# Patient Record
Sex: MALE | Race: WHITE | NOT HISPANIC OR LATINO
[De-identification: names, ages, dates, MRNs, and addresses within clinical notes are randomized per-mention and may not be internally consistent; named-entity substitution may affect disease eponyms.]

---

## 2021-08-10 ENCOUNTER — TRANSCRIPTION ENCOUNTER (OUTPATIENT)
Age: 66
End: 2021-08-10

## 2021-09-29 ENCOUNTER — APPOINTMENT (OUTPATIENT)
Dept: CARDIOLOGY | Facility: CLINIC | Age: 66
End: 2021-09-29
Payer: MEDICARE

## 2021-09-29 ENCOUNTER — NON-APPOINTMENT (OUTPATIENT)
Age: 66
End: 2021-09-29

## 2021-09-29 VITALS
HEART RATE: 79 BPM | SYSTOLIC BLOOD PRESSURE: 156 MMHG | HEIGHT: 74 IN | WEIGHT: 172 LBS | BODY MASS INDEX: 22.07 KG/M2 | DIASTOLIC BLOOD PRESSURE: 70 MMHG | TEMPERATURE: 98.1 F | RESPIRATION RATE: 16 BRPM | OXYGEN SATURATION: 99 %

## 2021-09-29 DIAGNOSIS — Z82.49 FAMILY HISTORY OF ISCHEMIC HEART DISEASE AND OTHER DISEASES OF THE CIRCULATORY SYSTEM: ICD-10-CM

## 2021-09-29 DIAGNOSIS — Z87.898 PERSONAL HISTORY OF OTHER SPECIFIED CONDITIONS: ICD-10-CM

## 2021-09-29 DIAGNOSIS — Z72.3 LACK OF PHYSICAL EXERCISE: ICD-10-CM

## 2021-09-29 DIAGNOSIS — Z78.9 OTHER SPECIFIED HEALTH STATUS: ICD-10-CM

## 2021-09-29 PROCEDURE — 93000 ELECTROCARDIOGRAM COMPLETE: CPT

## 2021-09-29 PROCEDURE — 99205 OFFICE O/P NEW HI 60 MIN: CPT

## 2021-09-29 NOTE — ASSESSMENT
[FreeTextEntry1] : Abnormal EKG -multiple CAD risk factor including his age, male sex, hypertension, long history of smoking ; I recommend echocardiography for LV size, wall motion, LVEF; I also recommended Lexiscan marker perfusion scan to see inducible ischemia.  Baby aspirin daily.\par \par Hypertension -uncontrolled; continue medications; low-salt diet; I added labetalol 20 mg twice daily; BP monitoring.\par \par Suspected PAD -he has cold lower extremities, I was not able to feel the pulses, he has no hair on his toes; JAMIE has been recommended; walking program has been recommended\par \par I advise lipid panel, BMP, hemoglobin A1c.\par \par Nicotine dependency -smoking she has been discussed with him; he understands clearly smoking associated hazards of variety of cancers, pulmonary/vascular complications; he is not interested in stopping smoking at this point.\par \par Aggressive risk factor modification has been discussed with a great length.  He will be reeval by me after cardiac testing.

## 2021-09-29 NOTE — PHYSICAL EXAM
[Well Developed] : well developed [Well Nourished] : well nourished [No Acute Distress] : no acute distress [Normal Conjunctiva] : normal conjunctiva [Normal Venous Pressure] : normal venous pressure [No Carotid Bruit] : no carotid bruit [Normal S1, S2] : normal S1, S2 [No Murmur] : no murmur [No Rub] : no rub [No Gallop] : no gallop [Clear Lung Fields] : clear lung fields [Good Air Entry] : good air entry [No Respiratory Distress] : no respiratory distress  [Soft] : abdomen soft [Non Tender] : non-tender [No Masses/organomegaly] : no masses/organomegaly [Normal Bowel Sounds] : normal bowel sounds [Normal Gait] : normal gait [No Edema] : no edema [No Cyanosis] : no cyanosis [No Clubbing] : no clubbing [No Varicosities] : no varicosities [No Rash] : no rash [No Skin Lesions] : no skin lesions [Moves all extremities] : moves all extremities [No Focal Deficits] : no focal deficits [Normal Speech] : normal speech [Alert and Oriented] : alert and oriented [Normal memory] : normal memory [de-identified] : Dressing applied to left upper chest, at the site of cancer

## 2021-09-29 NOTE — HISTORY OF PRESENT ILLNESS
[FreeTextEntry1] : 66-year-old  gentleman referred for cardiac evaluation for abnormal EKG.\par \par He had developed skin cancer squamous cell, he did not pay attention until it grew very large, currently he is taking Libtayo -immunotherapy injections and the lesion has decreased in size significantly.\par \par EKG confirmed possible old interval MI; patient is poor historian although he does not recollect having episode of chest pain.  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  He denies any exertional chest pain.  He gets short of breath on more than usual exertion.  Denies any PND orthopnea diaphoresis dizziness palpitations or edema or claudication symptoms.\par \par His other history includes depression and hypertension.\par \par No pressure CHF, MI, syncope.

## 2021-10-28 ENCOUNTER — NON-APPOINTMENT (OUTPATIENT)
Age: 66
End: 2021-10-28

## 2021-11-09 ENCOUNTER — APPOINTMENT (OUTPATIENT)
Dept: CARDIOLOGY | Facility: CLINIC | Age: 66
End: 2021-11-09
Payer: MEDICARE

## 2021-11-09 PROCEDURE — A9502: CPT

## 2021-11-09 PROCEDURE — 78452 HT MUSCLE IMAGE SPECT MULT: CPT

## 2021-11-09 PROCEDURE — 93306 TTE W/DOPPLER COMPLETE: CPT

## 2021-11-09 PROCEDURE — 93015 CV STRESS TEST SUPVJ I&R: CPT

## 2021-11-10 ENCOUNTER — APPOINTMENT (OUTPATIENT)
Dept: CARDIOLOGY | Facility: CLINIC | Age: 66
End: 2021-11-10
Payer: MEDICARE

## 2021-11-10 PROCEDURE — 93923 UPR/LXTR ART STDY 3+ LVLS: CPT

## 2021-11-15 ENCOUNTER — NON-APPOINTMENT (OUTPATIENT)
Age: 66
End: 2021-11-15

## 2021-11-22 ENCOUNTER — APPOINTMENT (OUTPATIENT)
Dept: CARDIOLOGY | Facility: CLINIC | Age: 66
End: 2021-11-22
Payer: MEDICARE

## 2021-11-22 VITALS
SYSTOLIC BLOOD PRESSURE: 114 MMHG | DIASTOLIC BLOOD PRESSURE: 48 MMHG | HEIGHT: 74 IN | TEMPERATURE: 96.9 F | BODY MASS INDEX: 22.07 KG/M2 | OXYGEN SATURATION: 93 % | HEART RATE: 60 BPM | WEIGHT: 172 LBS

## 2021-11-22 DIAGNOSIS — Z00.00 ENCOUNTER FOR GENERAL ADULT MEDICAL EXAMINATION W/OUT ABNORMAL FINDINGS: ICD-10-CM

## 2021-11-22 PROCEDURE — 99214 OFFICE O/P EST MOD 30 MIN: CPT

## 2021-11-22 RX ORDER — AMLODIPINE BESYLATE 5 MG/1
5 TABLET ORAL
Qty: 30 | Refills: 0 | Status: DISCONTINUED | COMMUNITY
Start: 2021-10-26 | End: 2021-11-22

## 2021-11-22 RX ORDER — AMLODIPINE BESYLATE 10 MG/1
10 TABLET ORAL
Qty: 90 | Refills: 3 | Status: DISCONTINUED | COMMUNITY
End: 2021-11-22

## 2021-12-08 ENCOUNTER — APPOINTMENT (OUTPATIENT)
Dept: CARDIOLOGY | Facility: CLINIC | Age: 66
End: 2021-12-08
Payer: MEDICARE

## 2021-12-08 ENCOUNTER — TRANSCRIPTION ENCOUNTER (OUTPATIENT)
Age: 66
End: 2021-12-08

## 2021-12-08 VITALS
BODY MASS INDEX: 23.1 KG/M2 | OXYGEN SATURATION: 95 % | WEIGHT: 180 LBS | HEART RATE: 59 BPM | SYSTOLIC BLOOD PRESSURE: 120 MMHG | DIASTOLIC BLOOD PRESSURE: 58 MMHG | HEIGHT: 74 IN

## 2021-12-08 PROCEDURE — 99214 OFFICE O/P EST MOD 30 MIN: CPT

## 2021-12-20 ENCOUNTER — APPOINTMENT (OUTPATIENT)
Dept: CARDIOLOGY | Facility: CLINIC | Age: 66
End: 2021-12-20

## 2021-12-20 ENCOUNTER — NON-APPOINTMENT (OUTPATIENT)
Age: 66
End: 2021-12-20

## 2021-12-27 ENCOUNTER — NON-APPOINTMENT (OUTPATIENT)
Age: 66
End: 2021-12-27

## 2021-12-29 ENCOUNTER — APPOINTMENT (OUTPATIENT)
Dept: CARDIOLOGY | Facility: CLINIC | Age: 66
End: 2021-12-29
Payer: MEDICARE

## 2021-12-29 VITALS
HEIGHT: 74 IN | WEIGHT: 173 LBS | SYSTOLIC BLOOD PRESSURE: 114 MMHG | OXYGEN SATURATION: 95 % | TEMPERATURE: 97.8 F | BODY MASS INDEX: 22.2 KG/M2 | DIASTOLIC BLOOD PRESSURE: 50 MMHG | HEART RATE: 56 BPM

## 2021-12-29 PROCEDURE — 99215 OFFICE O/P EST HI 40 MIN: CPT

## 2021-12-29 RX ORDER — OMEPRAZOLE 20 MG/1
20 CAPSULE, DELAYED RELEASE ORAL
Qty: 30 | Refills: 0 | Status: DISCONTINUED | COMMUNITY
Start: 2021-11-15 | End: 2021-12-29

## 2021-12-29 RX ORDER — NEOMYCIN AND POLYMYXIN B SULFATES AND HYDROCORTISONE OTIC 10; 3.5; 1 MG/ML; MG/ML; [USP'U]/ML
3.5-10000-1 SUSPENSION AURICULAR (OTIC)
Qty: 10 | Refills: 0 | Status: DISCONTINUED | COMMUNITY
Start: 2021-09-21 | End: 2021-12-29

## 2021-12-29 RX ORDER — PREDNISONE 20 MG/1
20 TABLET ORAL
Qty: 42 | Refills: 0 | Status: DISCONTINUED | COMMUNITY
Start: 2021-11-15 | End: 2021-12-29

## 2022-01-04 ENCOUNTER — OUTPATIENT (OUTPATIENT)
Dept: INPATIENT UNIT | Facility: HOSPITAL | Age: 67
LOS: 1 days | End: 2022-01-04
Payer: MEDICARE

## 2022-01-04 ENCOUNTER — NON-APPOINTMENT (OUTPATIENT)
Age: 67
End: 2022-01-04

## 2022-01-04 ENCOUNTER — RESULT REVIEW (OUTPATIENT)
Age: 67
End: 2022-01-04

## 2022-01-04 PROCEDURE — 93571 IV DOP VEL&/PRESS C FLO 1ST: CPT | Mod: 26,52,LD

## 2022-01-04 PROCEDURE — 93010 ELECTROCARDIOGRAM REPORT: CPT

## 2022-01-04 PROCEDURE — 93010 ELECTROCARDIOGRAM REPORT: CPT | Mod: 77,59

## 2022-01-04 PROCEDURE — 92928 PRQ TCAT PLMT NTRAC ST 1 LES: CPT | Mod: RC

## 2022-01-04 PROCEDURE — 92978 ENDOLUMINL IVUS OCT C 1ST: CPT | Mod: 26,RC

## 2022-01-04 PROCEDURE — 71045 X-RAY EXAM CHEST 1 VIEW: CPT | Mod: 26

## 2022-01-04 PROCEDURE — 93458 L HRT ARTERY/VENTRICLE ANGIO: CPT | Mod: 26,XU

## 2022-01-05 PROCEDURE — 93010 ELECTROCARDIOGRAM REPORT: CPT

## 2022-01-10 ENCOUNTER — RX CHANGE (OUTPATIENT)
Age: 67
End: 2022-01-10

## 2022-01-10 ENCOUNTER — APPOINTMENT (OUTPATIENT)
Dept: CARDIOLOGY | Facility: CLINIC | Age: 67
End: 2022-01-10
Payer: MEDICARE

## 2022-01-10 ENCOUNTER — NON-APPOINTMENT (OUTPATIENT)
Age: 67
End: 2022-01-10

## 2022-01-10 VITALS
WEIGHT: 176 LBS | BODY MASS INDEX: 22.59 KG/M2 | DIASTOLIC BLOOD PRESSURE: 40 MMHG | OXYGEN SATURATION: 95 % | SYSTOLIC BLOOD PRESSURE: 110 MMHG | HEIGHT: 74 IN | HEART RATE: 67 BPM | TEMPERATURE: 98 F

## 2022-01-10 PROCEDURE — 93000 ELECTROCARDIOGRAM COMPLETE: CPT

## 2022-01-10 PROCEDURE — 99215 OFFICE O/P EST HI 40 MIN: CPT

## 2022-01-10 RX ORDER — CLOPIDOGREL BISULFATE 75 MG/1
75 TABLET, FILM COATED ORAL
Qty: 98 | Refills: 3 | Status: DISCONTINUED | COMMUNITY
Start: 2022-01-10 | End: 2022-01-10

## 2022-01-10 RX ORDER — AZITHROMYCIN 250 MG/1
250 TABLET, FILM COATED ORAL
Qty: 6 | Refills: 0 | Status: DISCONTINUED | COMMUNITY
Start: 2021-11-29 | End: 2022-01-10

## 2022-01-10 RX ORDER — ROSUVASTATIN CALCIUM 10 MG/1
10 TABLET, FILM COATED ORAL DAILY
Qty: 90 | Refills: 3 | Status: DISCONTINUED | COMMUNITY
Start: 2021-11-22 | End: 2022-01-10

## 2022-01-10 RX ORDER — AMOXICILLIN AND CLAVULANATE POTASSIUM 875; 125 MG/1; MG/1
875-125 TABLET, COATED ORAL
Qty: 14 | Refills: 0 | Status: DISCONTINUED | COMMUNITY
Start: 2021-12-29 | End: 2022-01-10

## 2022-01-10 RX ORDER — TICAGRELOR 90 MG/1
90 TABLET ORAL TWICE DAILY
Refills: 0 | Status: DISCONTINUED | COMMUNITY
Start: 2022-01-04 | End: 2022-01-10

## 2022-01-10 NOTE — REASON FOR VISIT
[Symptom and Test Evaluation] : symptom and test evaluation [Coronary Artery Disease] : coronary artery disease [Family Member] : family member

## 2022-01-10 NOTE — HISTORY OF PRESENT ILLNESS
[FreeTextEntry1] : \par 66-year-old male smoker, former alcohol abuse, squamous and basal cell skin cancer treated, hypertension, hyperlipidemia, coronary artery disease with PCI to proximal RCA 1/4/22 here for follow-up after PCI.\par \par Transaminitis noted but much improved. 1-1.5 x uln.\par \par Compliant with medications.  Encouraged tobacco cessation.  Co-pay for Brilinta elevated.\par \par Right radial artery access site well-healing.\par \par \par TESTING:\par 1/4/22:\par  Coronary angiogram: IVUS guided PCI of proximal RCA with a 4.0 x 23 mm partha point (postdilated 4.5 mm).\par Moderate mid LAD disease not significant by far.  Normal LVEDP.\par \par 12/21 calcium score 683.  Concern for proximal multivessel disease\par Labwork: Hemoglobin 11.1.  Normal creatinine.  Elevated LFTs.  LDL 97\par 11/21 echocardiogram: EF 50, no wall motion abnormality, mild valvular heart disease.  PASP 36\par Pharmacologic NST: EF 46%, fixed defects.

## 2022-01-10 NOTE — PHYSICAL EXAM
[Well Developed] : well developed [Well Nourished] : well nourished [No Acute Distress] : no acute distress [Normal Conjunctiva] : normal conjunctiva [Normal Venous Pressure] : normal venous pressure [Normal S1, S2] : normal S1, S2 [No Rub] : no rub [No Gallop] : no gallop [Clear Lung Fields] : clear lung fields [Good Air Entry] : good air entry [No Respiratory Distress] : no respiratory distress  [Soft] : abdomen soft [Non Tender] : non-tender [Normal Gait] : normal gait [No Edema] : no edema [No Cyanosis] : no cyanosis [No Clubbing] : no clubbing [No Varicosities] : no varicosities [Moves all extremities] : moves all extremities [No Focal Deficits] : no focal deficits [Normal Speech] : normal speech [Alert and Oriented] : alert and oriented

## 2022-01-10 NOTE — DISCUSSION/SUMMARY
[FreeTextEntry1] : \par 66-year-old male smoker, former alcohol abuse, squamous and basal cell skin cancer treated, hypertension, hyperlipidemia, coronary artery disease with PCI to proximal RCA 1/4/22 here for follow-up after PCI. Transaminitis noted but much improved. 1-1.5 x uln.\par \par \par # CAD s/p PCI prox RCA, tobacco abuse, hypertension, hyperlipidemia: \par -Dual antiplatelet therapy for at least 6 months, longer pending clinical course. brilinta affordable. \par -With transaminitis, recheck LFTs this week.  If increasing, discussed with primary cardiologist about statin therapy. much improved thus far.\par - Cardiac rehab ordered \par - Tobacco  service consulted.\par - AAA u/s\par - Carotid duplex\par \par # pulm appt on wednesday. \par \par \par Follow-up with me in 3 months with cardiac rehab in interim.  We will set up follow-up with Dr. Thomson in 6 months.

## 2022-01-13 ENCOUNTER — NON-APPOINTMENT (OUTPATIENT)
Age: 67
End: 2022-01-13

## 2022-01-14 ENCOUNTER — NON-APPOINTMENT (OUTPATIENT)
Age: 67
End: 2022-01-14

## 2022-01-19 ENCOUNTER — APPOINTMENT (OUTPATIENT)
Dept: CARDIOLOGY | Facility: CLINIC | Age: 67
End: 2022-01-19

## 2022-01-27 DIAGNOSIS — F32.9 MAJOR DEPRESSIVE DISORDER, SINGLE EPISODE, UNSPECIFIED: ICD-10-CM

## 2022-01-27 DIAGNOSIS — F17.210 NICOTINE DEPENDENCE, CIGARETTES, UNCOMPLICATED: ICD-10-CM

## 2022-01-27 DIAGNOSIS — I25.10 ATHEROSCLEROTIC HEART DISEASE OF NATIVE CORONARY ARTERY WITHOUT ANGINA PECTORIS: ICD-10-CM

## 2022-01-27 DIAGNOSIS — E78.5 HYPERLIPIDEMIA, UNSPECIFIED: ICD-10-CM

## 2022-01-27 DIAGNOSIS — R93.1 ABNORMAL FINDINGS ON DIAGNOSTIC IMAGING OF HEART AND CORONARY CIRCULATION: ICD-10-CM

## 2022-01-27 DIAGNOSIS — I10 ESSENTIAL (PRIMARY) HYPERTENSION: ICD-10-CM

## 2022-01-27 DIAGNOSIS — R94.31 ABNORMAL ELECTROCARDIOGRAM [ECG] [EKG]: ICD-10-CM

## 2022-01-27 DIAGNOSIS — Z85.828 PERSONAL HISTORY OF OTHER MALIGNANT NEOPLASM OF SKIN: ICD-10-CM

## 2022-02-02 ENCOUNTER — NON-APPOINTMENT (OUTPATIENT)
Age: 67
End: 2022-02-02

## 2022-02-02 ENCOUNTER — APPOINTMENT (OUTPATIENT)
Dept: CARDIOLOGY | Facility: CLINIC | Age: 67
End: 2022-02-02
Payer: MEDICARE

## 2022-02-02 VITALS
WEIGHT: 190 LBS | OXYGEN SATURATION: 97 % | DIASTOLIC BLOOD PRESSURE: 58 MMHG | SYSTOLIC BLOOD PRESSURE: 126 MMHG | TEMPERATURE: 97.1 F | RESPIRATION RATE: 16 BRPM | HEIGHT: 74 IN | BODY MASS INDEX: 24.38 KG/M2 | HEART RATE: 60 BPM

## 2022-02-02 DIAGNOSIS — I25.10 ATHEROSCLEROTIC HEART DISEASE OF NATIVE CORONARY ARTERY W/OUT ANGINA PECTORIS: ICD-10-CM

## 2022-02-02 DIAGNOSIS — R94.39 ABNORMAL RESULT OF OTHER CARDIOVASCULAR FUNCTION STUDY: ICD-10-CM

## 2022-02-02 DIAGNOSIS — Z86.79 PERSONAL HISTORY OF OTHER DISEASES OF THE CIRCULATORY SYSTEM: ICD-10-CM

## 2022-02-02 PROCEDURE — 99215 OFFICE O/P EST HI 40 MIN: CPT

## 2022-02-02 RX ORDER — CEMIPLIMAB-RWLC 50 MG/ML
350 INJECTION INTRAVENOUS
Refills: 0 | Status: DISCONTINUED | COMMUNITY
End: 2022-02-02

## 2022-02-07 ENCOUNTER — RX CHANGE (OUTPATIENT)
Age: 67
End: 2022-02-07

## 2022-02-07 ENCOUNTER — NON-APPOINTMENT (OUTPATIENT)
Age: 67
End: 2022-02-07

## 2022-02-07 RX ORDER — TICAGRELOR 90 MG/1
90 TABLET ORAL TWICE DAILY
Qty: 180 | Refills: 3 | Status: DISCONTINUED | COMMUNITY
Start: 2022-01-10 | End: 2022-02-07

## 2022-02-07 RX ORDER — CLOPIDOGREL BISULFATE 300 MG/1
300 TABLET, FILM COATED ORAL
Qty: 2 | Refills: 0 | Status: DISCONTINUED | COMMUNITY
Start: 2022-02-07 | End: 2022-02-07

## 2022-02-08 ENCOUNTER — RX CHANGE (OUTPATIENT)
Age: 67
End: 2022-02-08

## 2022-03-17 ENCOUNTER — NON-APPOINTMENT (OUTPATIENT)
Age: 67
End: 2022-03-17

## 2022-04-11 ENCOUNTER — APPOINTMENT (OUTPATIENT)
Dept: CARDIOLOGY | Facility: CLINIC | Age: 67
End: 2022-04-11
Payer: MEDICARE

## 2022-04-11 VITALS
TEMPERATURE: 97.3 F | BODY MASS INDEX: 22.84 KG/M2 | SYSTOLIC BLOOD PRESSURE: 116 MMHG | OXYGEN SATURATION: 95 % | HEART RATE: 64 BPM | HEIGHT: 74 IN | DIASTOLIC BLOOD PRESSURE: 60 MMHG | WEIGHT: 178 LBS

## 2022-04-11 DIAGNOSIS — R06.03 ACUTE RESPIRATORY DISTRESS: ICD-10-CM

## 2022-04-11 PROCEDURE — 93979 VASCULAR STUDY: CPT

## 2022-04-11 PROCEDURE — 99215 OFFICE O/P EST HI 40 MIN: CPT

## 2022-04-11 PROCEDURE — 93880 EXTRACRANIAL BILAT STUDY: CPT

## 2022-04-11 RX ORDER — LABETALOL HYDROCHLORIDE 200 MG/1
200 TABLET, FILM COATED ORAL
Qty: 180 | Refills: 3 | Status: DISCONTINUED | COMMUNITY
Start: 2021-09-29 | End: 2022-04-11

## 2022-04-11 RX ORDER — ALBUTEROL SULFATE 108 UG/1
108 (90 BASE) AEROSOL, METERED RESPIRATORY (INHALATION) EVERY 6 HOURS
Refills: 0 | Status: DISCONTINUED | COMMUNITY
End: 2022-04-11

## 2022-04-11 RX ORDER — CLOPIDOGREL BISULFATE 75 MG/1
75 TABLET, FILM COATED ORAL ONCE
Qty: 8 | Refills: 0 | Status: COMPLETED | COMMUNITY
Start: 2022-02-07 | End: 2022-04-11

## 2022-04-11 RX ORDER — TIOTROPIUM BROMIDE INHALATION SPRAY 3.12 UG/1
2.5 SPRAY, METERED RESPIRATORY (INHALATION) DAILY
Refills: 0 | Status: ACTIVE | COMMUNITY

## 2022-04-11 RX ORDER — ASPIRIN 325 MG
81 TABLET ORAL
Qty: 90 | Refills: 3 | Status: COMPLETED | COMMUNITY
Start: 2021-12-20 | End: 2022-04-11

## 2022-04-24 ENCOUNTER — NON-APPOINTMENT (OUTPATIENT)
Age: 67
End: 2022-04-24

## 2022-04-26 ENCOUNTER — NON-APPOINTMENT (OUTPATIENT)
Age: 67
End: 2022-04-26

## 2022-04-26 NOTE — HISTORY OF PRESENT ILLNESS
[FreeTextEntry1] : \par 67-year-old male smoker, systolic heart failure, former alcohol abuse, squamous and basal cell skin cancer treated, hypertension, hyperlipidemia, coronary artery disease with PCI to proximal RCA 1/4/22. Current tobacco use.\par \par 4/2022: no angina. In interim, he had a pulmonary exacerbation after immunotherapy which improved.  Sees pulmonary. Had preoperative evaluation for flexible sigmoidoscopy for mass removal/colonoscopy given immunotherapy induced colitis. He was switched to Plavix  He denies angina or acute cardiac symptoms.  He has stable dyspnea with exertion and constant coughing. LDL 72.  Carotid duplex and AAA ultrasound done today as detailed. Compliant with medications. depression. fatigue. \par \par 1/2022 visit: Compliant with medications.  Encouraged tobacco cessation.  Co-pay for Brilinta elevated. Right radial artery access site well-healing.\par \par \par TESTING:\par 4/2022:\par CAROTID: No significant disease\par AAA SCREENING: No AAA\par LABWORK: Resolved liver profile.  LDL 72.  Triglycerides 115.  HDL 79.\par \par 1/4/22:\par Echocardiogram: EF 40 to 45%.  Global.  Mild diastolic dysfunction.  Normal RV function.  PASP 46.  Aortic root 4.1.\par Coronary angiogram: IVUS guided PCI of proximal RCA with a 4.0 x 23 mm partha point (postdilated 4.5 mm).\par Moderate mid LAD disease not significant by far.  Normal LVEDP.\par A1c 6.3.\par  \par 12/21 calcium score 683.  Concern for proximal multivessel disease\par Labwork: Hemoglobin 11.1.  Normal creatinine.  Elevated LFTs.  LDL 97\par 11/21 echocardiogram: EF 50, no wall motion abnormality, mild valvular heart disease.  PASP 36\par Pharmacologic NST: EF 46%, fixed defects.\par JAMIE: No significant disease

## 2022-04-26 NOTE — DISCUSSION/SUMMARY
[FreeTextEntry1] : \par 67-year-old male smoker, former alcohol abuse, squamous and basal cell skin cancer treated, hypertension, hyperlipidemia, coronary artery disease with PCI to proximal RCA 1/4/22.  Current tobacco use. No AAA and no significant carotid disease today.\par \par # PRE OPERATIVE CV EVALUATION FOR GI PROCEDURE INCLUDING BIOPSY/REMOVAL: METS>4. Had stent 3+ months ago. No recent ACS, decompensated heart failure, preclusive arrhythmias. \par - No further cardiac testing required prior to procedure. Continue cardiovascular medications as tolerated roma-procedurally except as detailed re antiplatelets. Judicious with sedation and fluids. \par - During time he needs to be off Plavix for the procedure, he will need to be on aspirin 81 mg daily monotherapy (after a 324 mg load). Then when he is deemed safe for Plavix then he will switch back to Plavix 75 mg daily monotherapy (after 300 mg load). \par - Consider pulmonary evaluation as well. \par \par # systolic chf 40-45% compensated, CAD s/p PCI prox RCA 1/4/22, tobacco abuse, hypertension, hyperlipidemia: \par - Can continue with Plavix and drop aspirin. Plavix monotherapy as he has completed 3 months without issue.  \par - Continue rosuvastatin 20 and serial lab work.  Goal LDL less than 70 currently 72.\par - Cardiac rehab ordered\par - Tobacco  service consulted.\par - labwork ordered\par - GDMT: switch labetalol to toprol 200. bp log in cardiac rehab and consider entresto in future. \par \par \par Follow closely with pulmonary and primary cardiologist.  Return to me in 6 months. TTE in 6 months or so.  ER precautions given to patient.

## 2022-04-28 ENCOUNTER — NON-APPOINTMENT (OUTPATIENT)
Age: 67
End: 2022-04-28

## 2022-06-26 ENCOUNTER — NON-APPOINTMENT (OUTPATIENT)
Age: 67
End: 2022-06-26

## 2022-06-27 ENCOUNTER — NON-APPOINTMENT (OUTPATIENT)
Age: 67
End: 2022-06-27

## 2022-07-13 ENCOUNTER — APPOINTMENT (OUTPATIENT)
Dept: CARDIOLOGY | Facility: CLINIC | Age: 67
End: 2022-07-13

## 2022-09-07 ENCOUNTER — NON-APPOINTMENT (OUTPATIENT)
Age: 67
End: 2022-09-07

## 2022-09-07 ENCOUNTER — OUTPATIENT (OUTPATIENT)
Dept: OUTPATIENT SERVICES | Facility: HOSPITAL | Age: 67
LOS: 1 days | End: 2022-09-07

## 2022-09-07 PROCEDURE — 88305 TISSUE EXAM BY PATHOLOGIST: CPT | Mod: 26

## 2022-09-08 DIAGNOSIS — D37.5 NEOPLASM OF UNCERTAIN BEHAVIOR OF RECTUM: ICD-10-CM

## 2022-09-10 ENCOUNTER — NON-APPOINTMENT (OUTPATIENT)
Age: 67
End: 2022-09-10

## 2022-09-12 ENCOUNTER — NON-APPOINTMENT (OUTPATIENT)
Age: 67
End: 2022-09-12

## 2022-09-13 ENCOUNTER — NON-APPOINTMENT (OUTPATIENT)
Age: 67
End: 2022-09-13

## 2022-09-15 ENCOUNTER — NON-APPOINTMENT (OUTPATIENT)
Age: 67
End: 2022-09-15

## 2022-09-27 ENCOUNTER — APPOINTMENT (OUTPATIENT)
Dept: COLORECTAL SURGERY | Facility: CLINIC | Age: 67
End: 2022-09-27

## 2022-10-18 ENCOUNTER — NON-APPOINTMENT (OUTPATIENT)
Age: 67
End: 2022-10-18

## 2022-10-19 ENCOUNTER — APPOINTMENT (OUTPATIENT)
Dept: CARDIOLOGY | Facility: CLINIC | Age: 67
End: 2022-10-19

## 2022-10-19 VITALS
HEIGHT: 74 IN | DIASTOLIC BLOOD PRESSURE: 74 MMHG | SYSTOLIC BLOOD PRESSURE: 134 MMHG | OXYGEN SATURATION: 98 % | BODY MASS INDEX: 26.95 KG/M2 | HEART RATE: 57 BPM | WEIGHT: 210 LBS | TEMPERATURE: 97.3 F

## 2022-10-19 PROCEDURE — 99214 OFFICE O/P EST MOD 30 MIN: CPT

## 2022-10-19 RX ORDER — SULFAMETHOXAZOLE AND TRIMETHOPRIM 800; 160 MG/1; MG/1
800-160 TABLET ORAL
Qty: 12 | Refills: 0 | Status: DISCONTINUED | COMMUNITY
Start: 2022-03-23 | End: 2022-10-19

## 2022-10-19 RX ORDER — PREDNISONE 10 MG/1
10 TABLET ORAL
Refills: 0 | Status: DISCONTINUED | COMMUNITY
End: 2022-10-19

## 2022-10-19 RX ORDER — METOPROLOL SUCCINATE 200 MG/1
200 TABLET, EXTENDED RELEASE ORAL
Qty: 90 | Refills: 0 | Status: DISCONTINUED | COMMUNITY
Start: 2022-10-13 | End: 2022-10-19

## 2022-10-19 RX ORDER — SERTRALINE HYDROCHLORIDE 50 MG/1
50 TABLET, FILM COATED ORAL DAILY
Qty: 60 | Refills: 0 | Status: DISCONTINUED | COMMUNITY
End: 2022-10-19

## 2022-10-19 RX ORDER — METOPROLOL SUCCINATE 100 MG/1
100 TABLET, EXTENDED RELEASE ORAL DAILY
Qty: 90 | Refills: 1 | Status: DISCONTINUED | COMMUNITY
Start: 2022-04-11 | End: 2022-10-19

## 2022-10-19 RX ORDER — PANTOPRAZOLE 40 MG/1
40 TABLET, DELAYED RELEASE ORAL
Qty: 30 | Refills: 0 | Status: DISCONTINUED | COMMUNITY
Start: 2022-03-23 | End: 2022-10-19

## 2022-10-19 NOTE — DISCUSSION/SUMMARY
[FreeTextEntry1] : \par # PRE OPERATIVE CV EVALUATION FOR GI PROCEDURES INCLUDING BIOPSY/REMOVAL: METS>4. Had stent 9+ months ago. No recent ACS, decompensated heart failure, preclusive arrhythmias. \par - No further cardiac testing required prior to procedure. Continue cardiovascular medications as tolerated roma-procedurally except as detailed re antiplatelets. Judicious with sedation and fluids. \par - During time he needs to be off Plavix for the procedure, he will need to be on aspirin 81 mg daily monotherapy (after a 324 mg load). Then when he is deemed safe for Plavix, he will switch back to Plavix 75 mg daily monotherapy. He can be 5 days off Plavix.\par - Consider pulmonary evaluation as well. \par \par # systolic chf 40-45% compensated, CAD s/p PCI prox RCA 1/4/22, tobacco abuse, hypertension, hyperlipidemia: \par - Plavix monotherapy \par - increase rosuvastatin to 40. Goal LDL less than 70.\par - Cardiac rehab ordered\par - Tobacco  service consulted.\par - labwork \par - GDMT: could only tolerate toprol 100. recheck TTE and decide with additional therapy.\par \par \par See me back after TTE and discuss GDMT uptitration if needed. Then will see back Dr. Thomson. Follow closely with pulmonary and primary cardiologist. ER precautions given to patient.\par

## 2022-10-19 NOTE — HISTORY OF PRESENT ILLNESS
[FreeTextEntry1] : \par 67-year-old male smoker, systolic heart failure, former alcohol abuse, squamous and basal cell skin cancer treated, hypertension, hyperlipidemia, coronary artery disease with PCI to proximal RCA 1/4/22. Current tobacco use.\par newly diagnosed cancer in polyp\par \par 10/2022: underwent colonoscopy removed several polyps and 1 cancerous per their report. pending further evaluation for margins etc. no angina. much improved. \par \par 4/2022: no angina. In interim, he had a pulmonary exacerbation after immunotherapy which improved.  Sees pulmonary. Had preoperative evaluation for flexible sigmoidoscopy for mass removal/colonoscopy given immunotherapy induced colitis. He was switched to Plavix  He denies angina or acute cardiac symptoms.  He has stable dyspnea with exertion and constant coughing. LDL 72.  Carotid duplex and AAA ultrasound done today as detailed. Compliant with medications. depression. fatigue. \par \par 1/2022 visit: Compliant with medications.  Encouraged tobacco cessation.  Co-pay for Brilinta elevated. Right radial artery access site well-healing.\par \par \par TESTING:\par 7/2022 LABWORK: hgb 13.1. normal cmp. LDL 86. normal TFT. a1c 6.3. \par \par 4/2022:\par CAROTID: No significant disease\par AAA SCREENING: No AAA\par LABWORK: Resolved liver profile.  LDL 72.  Triglycerides 115.  HDL 79.\par \par 1/4/22:\par Echocardiogram: EF 40 to 45%.  Global.  Mild diastolic dysfunction.  Normal RV function.  PASP 46.  Aortic root 4.1.\par Coronary angiogram: IVUS guided PCI of proximal RCA with a 4.0 x 23 mm partha point (postdilated 4.5 mm).\par Moderate mid LAD disease not significant by far.  Normal LVEDP.\par A1c 6.3.\par  \par 12/21 calcium score 683.  Concern for proximal multivessel disease\par Labwork: Hemoglobin 11.1.  Normal creatinine.  Elevated LFTs.  LDL 97\par 11/21 echocardiogram: EF 50, no wall motion abnormality, mild valvular heart disease.  PASP 36\par Pharmacologic NST: EF 46%, fixed defects.\par JAMIE: No significant disease\par

## 2022-11-09 ENCOUNTER — APPOINTMENT (OUTPATIENT)
Dept: CARDIOLOGY | Facility: CLINIC | Age: 67
End: 2022-11-09

## 2022-11-09 VITALS
WEIGHT: 215 LBS | BODY MASS INDEX: 27.59 KG/M2 | OXYGEN SATURATION: 96 % | HEART RATE: 53 BPM | DIASTOLIC BLOOD PRESSURE: 70 MMHG | TEMPERATURE: 97.1 F | HEIGHT: 74 IN | SYSTOLIC BLOOD PRESSURE: 126 MMHG

## 2022-11-09 PROCEDURE — 99215 OFFICE O/P EST HI 40 MIN: CPT

## 2022-11-09 PROCEDURE — 93306 TTE W/DOPPLER COMPLETE: CPT

## 2022-11-09 NOTE — HISTORY OF PRESENT ILLNESS
[FreeTextEntry1] : \par 67-year-old male smoker, systolic heart failure, former alcohol abuse, squamous and basal cell skin cancer treated, hypertension, hyperlipidemia, coronary artery disease with PCI to proximal RCA 1/4/22. Current tobacco use. newly diagnosed cancer in polyp\par \par 11/2022: colonoscopy biopsies show margins clean. dermatology with biopsies reportedly recurrence unfortunately. tte today low normal ef asc ao 4.5. declines cardiac rehab and tobacco  service. \par \par 10/2022: underwent colonoscopy removed several polyps and 1 cancerous per their report. pending further evaluation for margins etc. no angina. much improved. \par \par 4/2022: no angina. In interim, he had a pulmonary exacerbation after immunotherapy which improved.  Sees pulmonary. Had preoperative evaluation for flexible sigmoidoscopy for mass removal/colonoscopy given immunotherapy induced colitis. He was switched to Plavix  He denies angina or acute cardiac symptoms.  He has stable dyspnea with exertion and constant coughing. LDL 72.  Carotid duplex and AAA ultrasound done today as detailed. Compliant with medications. depression. fatigue. \par \par 1/2022 visit: Compliant with medications.  Encouraged tobacco cessation.  Co-pay for Brilinta elevated. Right radial artery access site well-healing.\par \par \par TESTING:\par 11/2022 TTE\par \par 7/2022 LABWORK: hgb 13.1. normal cmp. LDL 86. normal TFT. a1c 6.3. \par \par 4/2022:\par CAROTID: No significant disease\par AAA SCREENING: No AAA\par LABWORK: Resolved liver profile.  LDL 72.  Triglycerides 115.  HDL 79.\par \par 1/4/22:\par Echocardiogram: EF 40 to 45%.  Global.  Mild diastolic dysfunction.  Normal RV function.  PASP 46.  Aortic root 4.1.\par Coronary angiogram: IVUS guided PCI of proximal RCA with a 4.0 x 23 mm partha point (postdilated 4.5 mm).\par Moderate mid LAD disease not significant by far.  Normal LVEDP.\par A1c 6.3.\par  \par 12/21 calcium score 683.  Concern for proximal multivessel disease\par Labwork: Hemoglobin 11.1.  Normal creatinine.  Elevated LFTs.  LDL 97\par 11/21 echocardiogram: EF 50, no wall motion abnormality, mild valvular heart disease.  PASP 36\par Pharmacologic NST: EF 46%, fixed defects.\par JAMIE: No significant disease\par

## 2022-11-09 NOTE — DISCUSSION/SUMMARY
[FreeTextEntry1] : \par # PRE OPERATIVE CV EVALUATION FOR GI PROCEDURES INCLUDING BIOPSY/REMOVAL: METS>4. Had stent 9+ months ago. No recent ACS, decompensated heart failure, preclusive arrhythmias. \par - No further cardiac testing required prior to procedure. Continue cardiovascular medications as tolerated roma-procedurally except as detailed re antiplatelets. Judicious with sedation and fluids. \par - During time he needs to be off Plavix for the procedure, he will need to be on aspirin 81 mg daily monotherapy (after a 324 mg load). Then when he is deemed safe for Plavix, he will switch back to Plavix 75 mg daily monotherapy. He can be 5 days off Plavix.\par - Consider pulmonary evaluation as well. \par \par # systolic chf now recovered EF compensated, CAD s/p PCI prox RCA 1/4/22, tobacco abuse, hypertension, hyperlipidemia: \par - Plavix monotherapy \par - rosuvastatin 40. Goal LDL less than 70.\par - Cardiac rehab ordered again for [facility] \par - Tobacco  service consulted however he declines \par - serial labwork \par - GDMT: could only tolerate toprol 100. \par \par # Ascending aorta 4.5 cm: \par - TTE in 6 months ordered \par \par # Malignancies: per derm, onc, GI, colorectal. avoid libtayo. \par \par will see back Dr. Thomson 6 months with echo and labwork. Follow closely with pulmonary and primary cardiologist. ER precautions given to patient.\par

## 2022-12-02 ENCOUNTER — RX RENEWAL (OUTPATIENT)
Age: 67
End: 2022-12-02

## 2022-12-16 ENCOUNTER — NON-APPOINTMENT (OUTPATIENT)
Age: 67
End: 2022-12-16

## 2022-12-17 ENCOUNTER — NON-APPOINTMENT (OUTPATIENT)
Age: 67
End: 2022-12-17

## 2023-01-20 ENCOUNTER — NON-APPOINTMENT (OUTPATIENT)
Age: 68
End: 2023-01-20

## 2023-01-23 ENCOUNTER — NON-APPOINTMENT (OUTPATIENT)
Age: 68
End: 2023-01-23

## 2023-01-26 ENCOUNTER — NON-APPOINTMENT (OUTPATIENT)
Age: 68
End: 2023-01-26

## 2023-01-30 ENCOUNTER — APPOINTMENT (OUTPATIENT)
Dept: CARDIOLOGY | Facility: CLINIC | Age: 68
End: 2023-01-30
Payer: MEDICARE

## 2023-01-30 ENCOUNTER — NON-APPOINTMENT (OUTPATIENT)
Age: 68
End: 2023-01-30

## 2023-01-30 VITALS
HEIGHT: 74 IN | TEMPERATURE: 97.3 F | OXYGEN SATURATION: 96 % | DIASTOLIC BLOOD PRESSURE: 64 MMHG | SYSTOLIC BLOOD PRESSURE: 114 MMHG | HEART RATE: 61 BPM | BODY MASS INDEX: 27.59 KG/M2 | WEIGHT: 215 LBS

## 2023-01-30 PROCEDURE — 99214 OFFICE O/P EST MOD 30 MIN: CPT

## 2023-01-30 PROCEDURE — 93000 ELECTROCARDIOGRAM COMPLETE: CPT

## 2023-01-30 RX ORDER — NICOTINAMIDE 100; 2; 850; 750; 50; 27 UG/1; MG/1; UG/1; MG/1; UG/1; MG/1
TABLET ORAL
Refills: 0 | Status: DISCONTINUED | COMMUNITY
End: 2023-01-30

## 2023-02-03 ENCOUNTER — NON-APPOINTMENT (OUTPATIENT)
Age: 68
End: 2023-02-03

## 2023-02-09 NOTE — HISTORY OF PRESENT ILLNESS
[FreeTextEntry1] : \par 67-year-old male smoker, systolic heart failure, former alcohol abuse, squamous and basal cell skin cancer treated, hypertension, hyperlipidemia, coronary artery disease with PCI to proximal RCA 1/4/22. Current tobacco use. newly diagnosed cancer in polyp\par \par 1/2023 VISIT: no angina. feels well. no recent hospitalizations. doing cardiac rehab. lowering tobacco to 5 cig/day. \par \par 11/2022: colonoscopy biopsies show margins clean. dermatology with biopsies reportedly recurrence unfortunately. tte today low normal ef asc ao 4.5. declines cardiac rehab and tobacco  service. \par \par 10/2022: underwent colonoscopy removed several polyps and 1 cancerous per their report. pending further evaluation for margins etc. no angina. much improved. \par \par 4/2022: no angina. In interim, he had a pulmonary exacerbation after immunotherapy which improved.  Sees pulmonary. Had preoperative evaluation for flexible sigmoidoscopy for mass removal/colonoscopy given immunotherapy induced colitis. He was switched to Plavix  He denies angina or acute cardiac symptoms.  He has stable dyspnea with exertion and constant coughing. LDL 72.  Carotid duplex and AAA ultrasound done today as detailed. Compliant with medications. depression. fatigue. \par \par 1/2022 visit: Compliant with medications.  Encouraged tobacco cessation.  Co-pay for Brilinta elevated. Right radial artery access site well-healing.\par \par \par \par TESTING:\par 11/2022 TTE\par \par 7/2022 LABWORK: hgb 13.1. normal cmp. LDL 86. normal TFT. a1c 6.3. \par \par 4/2022:\par CAROTID: No significant disease\par AAA SCREENING: No AAA\par LABWORK: Resolved liver profile.  LDL 72.  Triglycerides 115.  HDL 79.\par \par 1/4/22:\par Echocardiogram: EF 40 to 45%.  Global.  Mild diastolic dysfunction.  Normal RV function.  PASP 46.  Aortic root 4.1.\par Coronary angiogram: IVUS guided PCI of proximal RCA with a 4.0 x 23 mm partha point (postdilated 4.5 mm).\par Moderate mid LAD disease not significant by far.  Normal LVEDP.\par A1c 6.3.\par  \par 12/21 calcium score 683.  Concern for proximal multivessel disease\par Labwork: Hemoglobin 11.1.  Normal creatinine.  Elevated LFTs.  LDL 97\par 11/21 echocardiogram: EF 50, no wall motion abnormality, mild valvular heart disease.  PASP 36\par Pharmacologic NST: EF 46%, fixed defects.\par JAMIE: No significant disease\par

## 2023-02-09 NOTE — DISCUSSION/SUMMARY
[FreeTextEntry1] : \par # PRE OPERATIVE CV EVALUATION FOR SHOULDER SURGERY: METS>4. Had stent 1+ year ago. No recent ACS, decompensated heart failure, preclusive arrhythmias. \par - No further cardiac testing required prior to procedure. Continue cardiovascular medications as tolerated roma-procedurally except as detailed re antiplatelets. Judicious with sedation and fluids. \par - During time he needs to be off Plavix for the procedure, he will need to be on aspirin 81 mg daily monotherapy (after a 324 mg load). Then when he is deemed safe for Plavix, he will switch back to Plavix 75 mg daily monotherapy. He can be 5 days off Plavix.\par - Consider pulmonary evaluation as well. \par \par # systolic chf now recovered EF compensated, CAD s/p PCI prox RCA 1/4/22, tobacco abuse, hypertension, hyperlipidemia: \par - Plavix monotherapy \par - rosuvastatin 40. Goal LDL less than 70.\par - Cardiac rehab [facility] \par - Tobacco  service consulted however he declines \par - serial labwork \par - GDMT: could only tolerate toprol 100. \par \par # Ascending aorta 4.5 cm: \par - TTE in 6 months ordered \par \par # Malignancies: per derm, onc, GI, colorectal. avoid libtayo. \par \par will see back Dr. Thomson 6 months with echo and labwork. Follow closely with pulmonary and primary cardiologist. ER precautions given to patient.\par

## 2023-03-02 ENCOUNTER — RX RENEWAL (OUTPATIENT)
Age: 68
End: 2023-03-02

## 2023-05-08 ENCOUNTER — APPOINTMENT (OUTPATIENT)
Dept: CARDIOLOGY | Facility: CLINIC | Age: 68
End: 2023-05-08
Payer: MEDICARE

## 2023-05-08 VITALS
SYSTOLIC BLOOD PRESSURE: 110 MMHG | OXYGEN SATURATION: 96 % | WEIGHT: 208 LBS | HEIGHT: 74 IN | BODY MASS INDEX: 26.69 KG/M2 | HEART RATE: 60 BPM | DIASTOLIC BLOOD PRESSURE: 62 MMHG

## 2023-05-08 PROCEDURE — 93308 TTE F-UP OR LMTD: CPT

## 2023-05-08 PROCEDURE — 99214 OFFICE O/P EST MOD 30 MIN: CPT

## 2023-05-08 RX ORDER — ALBUTEROL SULFATE 90 UG/1
AEROSOL, METERED RESPIRATORY (INHALATION)
Refills: 0 | Status: ACTIVE | COMMUNITY

## 2023-05-08 NOTE — HISTORY OF PRESENT ILLNESS
[FreeTextEntry1] : \par 68-year-old male smoker, systolic heart failure, former alcohol abuse, squamous and basal cell skin cancer treated, hypertension, hyperlipidemia, coronary artery disease with PCI to proximal RCA 1/4/22. tobacco use. newly diagnosed cancer in polyp\par \par 5/2023 VISIT: echo today stable asc ao 4.4 cm. procedure in interim left shoulder surgery went well. increased tobacco use to 1/2 ppd. seeing pulmonary reports doing great from it. \par \par 1/2023 VISIT: no angina. feels well. no recent hospitalizations. doing cardiac rehab. lowering tobacco to 5 cig/day. \par \par 11/2022: colonoscopy biopsies show margins clean. dermatology with biopsies reportedly recurrence unfortunately. tte today low normal ef asc ao 4.5. declines cardiac rehab and tobacco  service. \par \par 10/2022: underwent colonoscopy removed several polyps and 1 cancerous per their report. pending further evaluation for margins etc. no angina. much improved. \par \par 4/2022: no angina. In interim, he had a pulmonary exacerbation after immunotherapy which improved.  Sees pulmonary. Had preoperative evaluation for flexible sigmoidoscopy for mass removal/colonoscopy given immunotherapy induced colitis. He was switched to Plavix  He denies angina or acute cardiac symptoms.  He has stable dyspnea with exertion and constant coughing. LDL 72.  Carotid duplex and AAA ultrasound done today as detailed. Compliant with medications. depression. fatigue. \par \par 1/2022 visit: Compliant with medications.  Encouraged tobacco cessation.  Co-pay for Brilinta elevated. Right radial artery access site well-healing.\par \par \par \par TESTING:\par \par 5/2023 tte\par \par 11/2022 TTE\par \par 7/2022 LABWORK: hgb 13.1. normal cmp. LDL 86. normal TFT. a1c 6.3. \par \par 4/2022:\par CAROTID: No significant disease\par AAA SCREENING: No AAA\par LABWORK: Resolved liver profile.  LDL 72.  Triglycerides 115.  HDL 79.\par \par 1/4/22:\par Echocardiogram: EF 40 to 45%.  Global.  Mild diastolic dysfunction.  Normal RV function.  PASP 46.  Aortic root 4.1.\par Coronary angiogram: IVUS guided PCI of proximal RCA with a 4.0 x 23 mm partha point (postdilated 4.5 mm).\par Moderate mid LAD disease not significant by far.  Normal LVEDP.\par A1c 6.3.\par  \par 12/21 calcium score 683.  Concern for proximal multivessel disease\par Labwork: Hemoglobin 11.1.  Normal creatinine.  Elevated LFTs.  LDL 97\par 11/21 echocardiogram: EF 50, no wall motion abnormality, mild valvular heart disease.  PASP 36\par Pharmacologic NST: EF 46%, fixed defects.\par JAMIE: No significant disease\par

## 2023-05-08 NOTE — DISCUSSION/SUMMARY
[FreeTextEntry1] : \par 5/2023 VISIT: echo today stable asc ao 4.4 cm. procedure in interim left shoulder surgery went well. increased tobacco use to 1/2 ppd. seeing pulmonary reports doing great from it. \par \par \par # PRE OPERATIVE CV EVALUATION FOR colonoscopy: METS>4. Had stent 1+ year ago. No recent ACS, decompensated heart failure, preclusive arrhythmias. \par - No further cardiac testing required prior to procedure. Continue cardiovascular medications as tolerated roma-procedurally except as detailed re antiplatelets. Judicious with sedation and fluids. \par - During time he needs to be off Plavix for the procedure, he will need to be on aspirin 81 mg daily monotherapy (after a 324 mg load). Then when he is deemed safe for Plavix, he will switch back to Plavix 75 mg daily monotherapy. He can be 5 days off Plavix.\par - Consider pulmonary evaluation as well. \par \par # systolic chf now recovered EF compensated, CAD s/p PCI prox RCA 1/4/22, tobacco abuse, hypertension, hyperlipidemia: \par - Plavix monotherapy (if procedures, then asa 81 in interim after 324 load) \par - rosuvastatin 40. Goal LDL less than 70.\par - Cardiac rehab 18 sessions left [facility] \par - Tobacco  service consulted \par - serial labwork \par - GDMT: could only tolerate toprol 100. \par \par # Ascending aorta 4.5 cm: \par - TTE yearly. last 5/2023 \par - bp optimization \par \par # Malignancies: per derm, onc, GI, colorectal. avoid libtayo. \par \par Follow closely with pulmonary. ER precautions given to patient.\par 
Opt out

## 2023-05-09 ENCOUNTER — FORM ENCOUNTER (OUTPATIENT)
Age: 68
End: 2023-05-09

## 2023-05-16 ENCOUNTER — FORM ENCOUNTER (OUTPATIENT)
Age: 68
End: 2023-05-16

## 2023-10-07 ENCOUNTER — NON-APPOINTMENT (OUTPATIENT)
Age: 68
End: 2023-10-07

## 2023-10-08 RX ORDER — ROSUVASTATIN CALCIUM 40 MG/1
40 TABLET, FILM COATED ORAL
Qty: 90 | Refills: 3 | Status: ACTIVE | COMMUNITY
Start: 2023-10-08 | End: 1900-01-01

## 2024-01-08 ENCOUNTER — APPOINTMENT (OUTPATIENT)
Dept: CARDIOLOGY | Facility: CLINIC | Age: 69
End: 2024-01-08
Payer: MEDICARE

## 2024-01-08 ENCOUNTER — NON-APPOINTMENT (OUTPATIENT)
Age: 69
End: 2024-01-08

## 2024-01-08 PROCEDURE — 93000 ELECTROCARDIOGRAM COMPLETE: CPT

## 2024-01-08 PROCEDURE — 99215 OFFICE O/P EST HI 40 MIN: CPT

## 2024-01-08 RX ORDER — NICOTINAMIDE 100; 2; 850; 750; 50; 27 UG/1; MG/1; UG/1; MG/1; UG/1; MG/1
TABLET ORAL DAILY
Refills: 0 | Status: ACTIVE | COMMUNITY

## 2024-01-08 NOTE — DISCUSSION/SUMMARY
[FreeTextEntry1] : 68-year-old male smoker, systolic heart failure, former alcohol abuse, squamous and basal cell skin cancer treated, hypertension, hyperlipidemia, coronary artery disease with PCI to proximal RCA 1/4/22. tobacco use. newly diagnosed cancer in polyp  # Dyspnea over past months. EKG profoundly changed concerning for dynamic coronary disease. He is a very poor historian due to memory issues. still cigarette use. He has no new murmur and no wheezing. I am having a hard time discerning timeline so I would like him to go to [facility] for updated echocardiogram and coronary angiogram.  Also consider rule out PE as he had recent flight from [place].   # systolic chf now recovered EF compensated, CAD s/p PCI prox RCA 1/4/22, tobacco abuse - restart DAPT instead of plavix monotherapy until further delineation  - rosuvastatin 40. Goal LDL less than 70. - Tobacco  service consulted  - serial labwork  - GDMT: could only tolerate toprol 100.   # Ascending aorta 4.5 cm: TTE at [facility]   # Malignancies: per derm, onc, GI, colorectal. avoid libtayo. he states margins are clear.   Follow after [facility]. Follow closely with pulmonary. ER precautions given to patient.

## 2024-01-08 NOTE — HISTORY OF PRESENT ILLNESS
[FreeTextEntry1] : 68-year-old male smoker, systolic heart failure, former alcohol abuse, squamous and basal cell skin cancer treated, hypertension, hyperlipidemia, coronary artery disease with PCI to proximal RCA 1/4/22, tobacco use. newly diagnosed cancer in polyp  1/2024 VISIT: dyspnea over past months. EKG profoundly changed concerning for dynamic coronary disease. He is a very poor historian due to memory issues. still cigarette use. I am having a hard time discerning timeline.    5/2023 VISIT: echo today stable asc ao 4.4 cm. procedure in interim left shoulder surgery went well. increased tobacco use to 1/2 ppd. seeing pulmonary reports doing great from it.   1/2023 VISIT: no angina. feels well. no recent hospitalizations. doing cardiac rehab. lowering tobacco to 5 cig/day.   11/2022: colonoscopy biopsies show margins clean. dermatology with biopsies reportedly recurrence unfortunately. tte today low normal ef asc ao 4.5. declines cardiac rehab and tobacco  service.   10/2022: underwent colonoscopy removed several polyps and 1 cancerous per their report. pending further evaluation for margins etc. no angina. much improved.   4/2022: no angina. In interim, he had a pulmonary exacerbation after immunotherapy which improved.  Sees pulmonary. Had preoperative evaluation for flexible sigmoidoscopy for mass removal/colonoscopy given immunotherapy induced colitis. He was switched to Plavix  He denies angina or acute cardiac symptoms.  He has stable dyspnea with exertion and constant coughing. LDL 72.  Carotid duplex and AAA ultrasound done today as detailed. Compliant with medications. depression. fatigue.   1/2022 visit: Compliant with medications.  Encouraged tobacco cessation.  Co-pay for Brilinta elevated. Right radial artery access site well-healing.   TESTING I REVIEWED TODAY excluding above:  5/2023 tte  11/2022 TTE  7/2022 LABWORK: hgb 13.1. normal cmp. LDL 86. normal TFT. a1c 6.3.   4/2022: CAROTID: No significant disease AAA SCREENING: No AAA LABWORK: Resolved liver profile.  LDL 72.  Triglycerides 115.  HDL 79.  1/4/22: Echocardiogram: EF 40 to 45%.  Global.  Mild diastolic dysfunction.  Normal RV function.  PASP 46.  Aortic root 4.1. Coronary angiogram: IVUS guided PCI of proximal RCA with a 4.0 x 23 mm partha point (postdilated 4.5 mm). Moderate mid LAD disease not significant by far.  Normal LVEDP. A1c 6.3.   12/21 calcium score 683.  Concern for proximal multivessel disease Labwork: Hemoglobin 11.1.  Normal creatinine.  Elevated LFTs.  LDL 97 11/21 echocardiogram: EF 50, no wall motion abnormality, mild valvular heart disease.  PASP 36 Pharmacologic NST: EF 46%, fixed defects. JAMIE: No significant disease

## 2024-01-08 NOTE — REASON FOR VISIT
[Symptom and Test Evaluation] : symptom and test evaluation [Coronary Artery Disease] : coronary artery disease [Family Member] : family member [FreeTextEntry3] : Dr. Grace Gama

## 2024-01-08 NOTE — PHYSICAL EXAM
[Well Developed] : well developed [Well Nourished] : well nourished [No Acute Distress] : no acute distress [Normal Conjunctiva] : normal conjunctiva [Normal Venous Pressure] : normal venous pressure [Normal S1, S2] : normal S1, S2 [No Rub] : no rub [No Gallop] : no gallop [Clear Lung Fields] : clear lung fields [Good Air Entry] : good air entry [No Respiratory Distress] : no respiratory distress  [Non Tender] : non-tender [Soft] : abdomen soft [Normal Gait] : normal gait [No Edema] : no edema [No Cyanosis] : no cyanosis [No Clubbing] : no clubbing [No Varicosities] : no varicosities [Moves all extremities] : moves all extremities [No Focal Deficits] : no focal deficits [Normal Speech] : normal speech [Alert and Oriented] : alert and oriented [de-identified] : BP 90/48, HR 54, O2 95% Weight 201

## 2024-01-12 ENCOUNTER — APPOINTMENT (OUTPATIENT)
Dept: CARDIOLOGY | Facility: CLINIC | Age: 69
End: 2024-01-12
Payer: MEDICARE

## 2024-01-12 ENCOUNTER — APPOINTMENT (OUTPATIENT)
Dept: CARDIOLOGY | Facility: CLINIC | Age: 69
End: 2024-01-12

## 2024-01-12 VITALS
DIASTOLIC BLOOD PRESSURE: 82 MMHG | HEIGHT: 74 IN | SYSTOLIC BLOOD PRESSURE: 134 MMHG | BODY MASS INDEX: 26.18 KG/M2 | HEART RATE: 74 BPM | OXYGEN SATURATION: 98 % | WEIGHT: 204 LBS

## 2024-01-12 PROCEDURE — 99204 OFFICE O/P NEW MOD 45 MIN: CPT

## 2024-01-12 NOTE — DISCUSSION/SUMMARY
[Patient] : the patient [___ Month(s)] : in [unfilled] month(s) [With ___] : with [unfilled] [FreeTextEntry1] : 68M w/ PMH as above presenting for post-cath follow up.  Coronary anatomy seems non-obstructive overall.  1. CAD - continue DAPT.  If worsening symptoms, plan on second look cath with interrogation of the LAD.   2. HTN - add back metoprolol 25 mg PO daily and continue losartan 25 mg PO daily. 3. Smoking cessation. 4. HLD - continue crestor 40 mg PO QHS.  RTC 1 month w/ RM

## 2024-01-12 NOTE — REASON FOR VISIT
[Hyperlipidemia] : hyperlipidemia [Hypertension] : hypertension [Coronary Artery Disease] : coronary artery disease [FreeTextEntry3] : Dr. Grace Gama

## 2024-01-12 NOTE — PHYSICAL EXAM
[Well Developed] : well developed [Well Nourished] : well nourished [No Acute Distress] : no acute distress [Normal Conjunctiva] : normal conjunctiva [Normal Venous Pressure] : normal venous pressure [No Carotid Bruit] : no carotid bruit [Normal S1, S2] : normal S1, S2 [No Murmur] : no murmur [No Rub] : no rub [No Gallop] : no gallop [Clear Lung Fields] : clear lung fields [Good Air Entry] : good air entry [No Respiratory Distress] : no respiratory distress  [Soft] : abdomen soft [Non Tender] : non-tender [No Masses/organomegaly] : no masses/organomegaly [Normal Bowel Sounds] : normal bowel sounds [Normal Gait] : normal gait [No Edema] : no edema [No Cyanosis] : no cyanosis [No Clubbing] : no clubbing [No Varicosities] : no varicosities [No Rash] : no rash [No Skin Lesions] : no skin lesions [Moves all extremities] : moves all extremities [No Focal Deficits] : no focal deficits [Normal Speech] : normal speech [Alert and Oriented] : alert and oriented [Normal memory] : normal memory [de-identified] : Right radial cath site c/d/i. 2+ pulse with ulnar occlusion. No hematoma or swelling.

## 2024-01-12 NOTE — HISTORY OF PRESENT ILLNESS
[FreeTextEntry1] : 68M w/ PMH of HTN, HLD, CAD s/p PCI, COPD, DM, active smoker and memory difficulties presenting for post-cath follow up.  He was sent to the ER from our office for a markedly abnormal EKG.  He underwent cardiac cath which revealed a moderate non-obstructive pLAD lesion and otherwise non-obstructive anatomy.  He was diagnosed with stress-induced CM.

## 2024-01-19 ENCOUNTER — NON-APPOINTMENT (OUTPATIENT)
Age: 69
End: 2024-01-19

## 2024-01-20 RX ORDER — CLOPIDOGREL BISULFATE 75 MG/1
75 TABLET, FILM COATED ORAL
Qty: 90 | Refills: 3 | Status: ACTIVE | COMMUNITY
Start: 2024-01-20 | End: 1900-01-01

## 2024-02-10 PROBLEM — K63.89 MASS OF COLON: Status: ACTIVE | Noted: 2022-04-25

## 2024-02-10 PROBLEM — C44.92 SCC (SQUAMOUS CELL CARCINOMA): Status: ACTIVE | Noted: 2021-09-29

## 2024-02-10 PROBLEM — F32.A DEPRESSION: Status: ACTIVE | Noted: 2021-09-29

## 2024-02-10 NOTE — PHYSICAL EXAM
[Well Developed] : well developed [Well Nourished] : well nourished [Normal Venous Pressure] : normal venous pressure [No Acute Distress] : no acute distress [Normal Conjunctiva] : normal conjunctiva [No Rub] : no rub [Normal S1, S2] : normal S1, S2 [Clear Lung Fields] : clear lung fields [No Gallop] : no gallop [Soft] : abdomen soft [Good Air Entry] : good air entry [No Respiratory Distress] : no respiratory distress  [Non Tender] : non-tender [Normal Gait] : normal gait [No Cyanosis] : no cyanosis [No Edema] : no edema [No Varicosities] : no varicosities [No Clubbing] : no clubbing [No Focal Deficits] : no focal deficits [Moves all extremities] : moves all extremities [Normal Speech] : normal speech [Alert and Oriented] : alert and oriented [de-identified] : BP 90/48, HR 54, O2 95% Weight 201

## 2024-02-12 ENCOUNTER — APPOINTMENT (OUTPATIENT)
Dept: CARDIOLOGY | Facility: CLINIC | Age: 69
End: 2024-02-12
Payer: MEDICARE

## 2024-02-12 VITALS
SYSTOLIC BLOOD PRESSURE: 108 MMHG | HEIGHT: 74 IN | DIASTOLIC BLOOD PRESSURE: 62 MMHG | HEART RATE: 75 BPM | OXYGEN SATURATION: 98 % | BODY MASS INDEX: 25.93 KG/M2 | WEIGHT: 202 LBS

## 2024-02-12 DIAGNOSIS — K63.89 OTHER SPECIFIED DISEASES OF INTESTINE: ICD-10-CM

## 2024-02-12 DIAGNOSIS — F32.A DEPRESSION, UNSPECIFIED: ICD-10-CM

## 2024-02-12 DIAGNOSIS — C44.92 SQUAMOUS CELL CARCINOMA OF SKIN, UNSPECIFIED: ICD-10-CM

## 2024-02-12 PROCEDURE — 99214 OFFICE O/P EST MOD 30 MIN: CPT

## 2024-02-12 NOTE — DISCUSSION/SUMMARY
[FreeTextEntry1] : 68-year-old male smoker, systolic heart failure, former alcohol abuse, squamous and basal cell skin cancer treated, hypertension, coronary artery disease with PCI to proximal RCA 1/4/22, tobacco use. newly diagnosed cancer in polyp   Last visit, had concern for unstable angina but diagnosed with takotsubo's cardiomyopathy. Angiography had no obstructive disease. He also had no pulmonary hypertension to explain dyspnea.   # systolic chf now recovered EF compensated, CAD s/p PCI prox RCA 1/2022 - continue dapt - rosuvastatin 40. Goal LDL less than 70. - Tobacco  service consulted but he does not appear to be amenable  - GDMT: BB/ARB  # Ascending aorta 4.5 cm: watch every year 1/2025   # Malignancies: per derm, onc, GI, colorectal. avoid libtayo. he states margins are clear.   Follow in 4 months. Follow closely with pulmonary. ER precautions given to patient.

## 2024-02-12 NOTE — REASON FOR VISIT
[Symptom and Test Evaluation] : symptom and test evaluation [Coronary Artery Disease] : coronary artery disease [Family Member] : family member [CV Risk Factors and Non-Cardiac Disease] : CV risk factors and non-cardiac disease [FreeTextEntry3] : Dr. Grace Gama

## 2024-02-12 NOTE — HISTORY OF PRESENT ILLNESS
[FreeTextEntry1] : 68-year-old male smoker, systolic heart failure, former alcohol abuse, squamous and basal cell skin cancer treated, hypertension, coronary artery disease with PCI to proximal RCA 1/4/22, tobacco use. newly diagnosed cancer in polyp  2/2024 VISIT: resolved. coronary angiography with non obstructive disease and no pulmonary hypertension. +tobacco use.   1/2024 VISIT: dyspnea over past months. EKG profoundly changed concerning for dynamic coronary disease. He is a very poor historian due to memory issues. still cigarette use. I am having a hard time discerning timeline.    5/2023 VISIT: echo today stable asc ao 4.4 cm. procedure in interim left shoulder surgery went well. increased tobacco use to 1/2 ppd. seeing pulmonary reports doing great from it.   1/2023 VISIT: no angina. feels well. no recent hospitalizations. doing cardiac rehab. lowering tobacco to 5 cig/day.   11/2022: colonoscopy biopsies show margins clean. dermatology with biopsies reportedly recurrence unfortunately. tte today low normal ef asc ao 4.5. declines cardiac rehab and tobacco  service.   10/2022: underwent colonoscopy removed several polyps and 1 cancerous per their report. pending further evaluation for margins etc. no angina. much improved.   4/2022: no angina. In interim, he had a pulmonary exacerbation after immunotherapy which improved.  Sees pulmonary. Had preoperative evaluation for flexible sigmoidoscopy for mass removal/colonoscopy given immunotherapy induced colitis. He was switched to Plavix  He denies angina or acute cardiac symptoms.  He has stable dyspnea with exertion and constant coughing. LDL 72.  Carotid duplex and AAA ultrasound done today as detailed. Compliant with medications. depression. fatigue.   1/2022 visit: Compliant with medications.  Encouraged tobacco cessation.  Co-pay for Brilinta elevated. Right radial artery access site well-healing.   TESTING I REVIEWED TODAY excluding above:  5/2023 tte  11/2022 TTE  7/2022 LABWORK: hgb 13.1. normal cmp. LDL 86. normal TFT. a1c 6.3.   4/2022: CAROTID: No significant disease AAA SCREENING: No AAA LABWORK: Resolved liver profile.  LDL 72.  Triglycerides 115.  HDL 79.  1/4/22: Echocardiogram: EF 40 to 45%.  Global.  Mild diastolic dysfunction.  Normal RV function.  PASP 46.  Aortic root 4.1. Coronary angiogram: IVUS guided PCI of proximal RCA with a 4.0 x 23 mm partha point (postdilated 4.5 mm). Moderate mid LAD disease not significant by far.  Normal LVEDP. A1c 6.3.   12/21 calcium score 683.  Concern for proximal multivessel disease Labwork: Hemoglobin 11.1.  Normal creatinine.  Elevated LFTs.  LDL 97 11/21 echocardiogram: EF 50, no wall motion abnormality, mild valvular heart disease.  PASP 36 Pharmacologic NST: EF 46%, fixed defects. JAMIE: No significant disease

## 2024-03-04 ENCOUNTER — APPOINTMENT (OUTPATIENT)
Dept: CARDIOLOGY | Facility: CLINIC | Age: 69
End: 2024-03-04

## 2024-03-17 ENCOUNTER — NON-APPOINTMENT (OUTPATIENT)
Age: 69
End: 2024-03-17

## 2024-04-03 ENCOUNTER — APPOINTMENT (OUTPATIENT)
Dept: CARDIOLOGY | Facility: CLINIC | Age: 69
End: 2024-04-03
Payer: MEDICARE

## 2024-04-03 VITALS
WEIGHT: 210 LBS | HEART RATE: 60 BPM | BODY MASS INDEX: 26.95 KG/M2 | SYSTOLIC BLOOD PRESSURE: 130 MMHG | DIASTOLIC BLOOD PRESSURE: 62 MMHG | HEIGHT: 74 IN | OXYGEN SATURATION: 96 %

## 2024-04-03 DIAGNOSIS — Z01.810 ENCOUNTER FOR PREPROCEDURAL CARDIOVASCULAR EXAMINATION: ICD-10-CM

## 2024-04-03 PROCEDURE — 99214 OFFICE O/P EST MOD 30 MIN: CPT

## 2024-04-03 RX ORDER — METFORMIN HYDROCHLORIDE 500 MG/1
500 TABLET, COATED ORAL 3 TIMES DAILY
Refills: 0 | Status: DISCONTINUED | COMMUNITY
End: 2024-04-03

## 2024-04-03 RX ORDER — CHOLECALCIFEROL (VITAMIN D3) 25 MCG
TABLET ORAL TWICE DAILY
Refills: 0 | Status: DISCONTINUED | COMMUNITY
End: 2024-04-03

## 2024-04-03 NOTE — HISTORY OF PRESENT ILLNESS
[FreeTextEntry1] : LELO BUNCH is a 69 year old male with a past medical history of systolic heart failure, former alcohol abuse, squamous and basal cell skin cancer treated, hypertension, coronary artery disease with PCI to proximal RCA 1/4/22, tobacco use. newly diagnosed cancer in polyp. Asc ao dilation.  active smoker and memory difficulties  Had preoperative evaluation for flexible sigmoidoscopy for mass removal/colonoscopy given immunotherapy induced colitis.  Seen by Dr. Pettit 1/8/24 with SOB and EKG changes. Sent to ER for echo and cath and CT to r/o PE. Seen post cath f/u 1/12/24. He was sent to the ER from our office for a markedly abnormal EKG. He underwent cardiac cath which revealed a moderate non-obstructive pLAD lesion and otherwise non-obstructive anatomy. He was diagnosed with stress-induced CM/takotsubo's cardiomyopathy. Last saw Dr. Pettit 2/2024. In the interim there have been no hospitalizations or procedures. Reports HASSAN that is unchanged. He denies chest pain, pressure, palpitations, unusual shortness of breath, orthopnea, LE edema, lightheadedness, dizziness, near syncope or syncope. Active smoker. Walks/has recently done cardiac rehab without any CP. Presents today for cardiac clearance for biopsy of lymph node on 4/8/24 with Dr. Young, interventional radiology in [place]. Number [phone]. Fax [phone].  /60 on my exam.  Testing:  EKG 4/3/24: SB at 58 bpm with PAC, possible old inferior infarct, LAD, PRWP, nonspecific ST-T wave abnormalities   Zio 3/19/24: SR average HR 59 bpm. 8 runs of AT, longest 9 beats. PAC burden 1.2%. PVC burden 8.9%. V bigem and trigem noted.  Cath 1/8/24: Mild foci of instent restenosis (up to 30%) at prox and distal aspects of RCA stent. Mild (25%) focal eccentric prox LAC dz. Mild luminal dz cannot explain patient's cardiomyopathy or EKG changes; this is c/w stress induced cardiomyopathy. Cormal CO/CI with low normal biventricular filling pressures. Normal PVC of 1.48 OLIVARES with no evicence of pulm htn.  Echo 1/8/24: EF 50-55%. Apical inferior segment and apex are akinetic. Ascending aorta diameter is dilate, measuring 4.4 cm. Mild MR. Mild TR. Mild pulm htn.  Labs 1/2024: WBC 7.12, Hgb 13.2, HCT 36.9, plt 212, Na 138, K 4.3, Cr 1, Ca 9.5, AST 25, ALT 20, Mag 1.9, Trigs 135, Chol 106, HDL 30, LDL 49, CPK 65, TSH 2.15, A1C 6.1, Trop 70, Trop 62.  Carotid u/s 4/2022: Mild nonobstructive carotid dz seen BL.  Abd u/s 4/2022: Mild plaque. No AAA.  12/21 calcium score 683. Concern for proximal multivessel disease  JAMIE 11/10/21: JAMIE suggests borderline disease in left calf, otherwise normal study.  [facility] 11/2021: Lexiscan. Negative for ischemia by EKG. The LV was normal in size. There are small sized, mild to mod defects in inferior and inferoseptal walls that are fixed, suggestive of infarct. THe observed defects partially correct with prone imaging. EG 46%.

## 2024-04-03 NOTE — DISCUSSION/SUMMARY
[FreeTextEntry1] : LELO BUNCH is a 69 year old M who presents today Apr 03, 2024 with the above history and the following active issues:  # systolic chf now recovered EF compensated, CAD s/p PCI prox RCA 1/2022 - continue dapt - rosuvastatin 40. Goal LDL less than 70. - Tobacco  service consulted but he does not appear to be amenable - GDMT: BB/ARB CAD - continue DAPT. If worsening symptoms, plan on second look cath with interrogation of the LAD.  HTN - on metoprolol 25 mg PO daily and continue losartan 25 mg PO daily.  Smoking cessation advised.  HLD - continue crestor 40 mg PO QHS.  Ascending aorta 4.5 cm: watch every year 1/2025  Malignancies: per derm, onc, GI, colorectal. avoid libtayo. he states margins are clear.  Follows closely with pulmonary.  Preoperative cardiovascular examination. Patient may proceed with biopsy of lymph node on 4/8/24 with Dr. Young, interventional radiology in [place]. Number [phone]. Fax [phone]. At present, there are no active cardiac conditions.  No recent unstable coronary syndromes, decompensated heart failure, severe valvular heart disease or significant dysrhythmias.   Baseline functional status is acceptable.     The clinical benefit of the proposed procedure outweighs the associated cardiovascular risk.   Risk not attenuated with further CV testing.   Prior testing as outlined above. Optimized from a cardiovascular perspective. May hold Plavix 5-7 days prior to procedure. Resume post op asap per surgeon's discretion. Remain on ASA. Continue beta blocker DVT ppx Even fluid balance with h/o CHF   Ongoing f/u with PCP.  F/U 6/2024 with Dr. Pettit or PRN Discussed red flag symptoms, which would warrant sooner or emergent medical evaluation. Any questions and concerns were addressed and resolved.  Sincerely, Melita [facility] Patient's history, testing, and plan was reviewed with supervising physician, Dr. Patrick Valentino

## 2024-04-03 NOTE — PHYSICAL EXAM
[Well Developed] : well developed [Well Nourished] : well nourished [No Acute Distress] : no acute distress [Normal Conjunctiva] : normal conjunctiva [Normal Venous Pressure] : normal venous pressure [No Carotid Bruit] : no carotid bruit [No Murmur] : no murmur [Normal S1, S2] : normal S1, S2 [No Rub] : no rub [No Gallop] : no gallop [Clear Lung Fields] : clear lung fields [Good Air Entry] : good air entry [No Respiratory Distress] : no respiratory distress  [Soft] : abdomen soft [Non Tender] : non-tender [No Masses/organomegaly] : no masses/organomegaly [Normal Bowel Sounds] : normal bowel sounds [Normal Gait] : normal gait [No Cyanosis] : no cyanosis [No Edema] : no edema [No Clubbing] : no clubbing [No Varicosities] : no varicosities [No Rash] : no rash [No Skin Lesions] : no skin lesions [Moves all extremities] : moves all extremities [No Focal Deficits] : no focal deficits [Normal Speech] : normal speech [Alert and Oriented] : alert and oriented [Normal memory] : normal memory

## 2024-04-24 ENCOUNTER — NON-APPOINTMENT (OUTPATIENT)
Age: 69
End: 2024-04-24

## 2024-04-24 ENCOUNTER — RESULT CHARGE (OUTPATIENT)
Age: 69
End: 2024-04-24

## 2024-05-12 PROBLEM — F17.200 NICOTINE DEPENDENCE: Status: ACTIVE | Noted: 2021-09-29

## 2024-05-12 PROBLEM — I10 HYPERTENSION: Status: ACTIVE | Noted: 2021-09-29

## 2024-05-12 PROBLEM — R06.02 SOB (SHORTNESS OF BREATH) ON EXERTION: Status: ACTIVE | Noted: 2021-09-29

## 2024-05-12 PROBLEM — F17.200 CURRENT SMOKER: Status: ACTIVE | Noted: 2021-09-29

## 2024-05-12 PROBLEM — Z95.5 S/P CORONARY ARTERY STENT PLACEMENT: Status: ACTIVE | Noted: 2022-02-02

## 2024-05-12 PROBLEM — I25.10 CAD (CORONARY ARTERY DISEASE): Status: ACTIVE | Noted: 2022-02-02

## 2024-05-13 ENCOUNTER — NON-APPOINTMENT (OUTPATIENT)
Age: 69
End: 2024-05-13

## 2024-05-13 ENCOUNTER — APPOINTMENT (OUTPATIENT)
Dept: CARDIOLOGY | Facility: CLINIC | Age: 69
End: 2024-05-13
Payer: MEDICARE

## 2024-05-13 VITALS
HEART RATE: 55 BPM | DIASTOLIC BLOOD PRESSURE: 56 MMHG | BODY MASS INDEX: 26.95 KG/M2 | SYSTOLIC BLOOD PRESSURE: 90 MMHG | OXYGEN SATURATION: 96 % | HEIGHT: 74 IN | WEIGHT: 210 LBS

## 2024-05-13 DIAGNOSIS — I25.10 ATHEROSCLEROTIC HEART DISEASE OF NATIVE CORONARY ARTERY W/OUT ANGINA PECTORIS: ICD-10-CM

## 2024-05-13 DIAGNOSIS — I10 ESSENTIAL (PRIMARY) HYPERTENSION: ICD-10-CM

## 2024-05-13 DIAGNOSIS — F17.200 NICOTINE DEPENDENCE, UNSPECIFIED, UNCOMPLICATED: ICD-10-CM

## 2024-05-13 DIAGNOSIS — Z95.5 PRESENCE OF CORONARY ANGIOPLASTY IMPLANT AND GRAFT: ICD-10-CM

## 2024-05-13 DIAGNOSIS — R06.02 SHORTNESS OF BREATH: ICD-10-CM

## 2024-05-13 PROCEDURE — 99214 OFFICE O/P EST MOD 30 MIN: CPT

## 2024-05-13 PROCEDURE — 93000 ELECTROCARDIOGRAM COMPLETE: CPT

## 2024-05-13 RX ORDER — GABAPENTIN 100 MG
100 TABLET ORAL TWICE DAILY
Refills: 0 | Status: ACTIVE | COMMUNITY

## 2024-05-13 RX ORDER — METOPROLOL SUCCINATE 25 MG/1
25 TABLET, EXTENDED RELEASE ORAL DAILY
Qty: 45 | Refills: 3 | Status: ACTIVE | COMMUNITY
Start: 2022-10-19 | End: 1900-01-01

## 2024-05-13 RX ORDER — SERTRALINE HYDROCHLORIDE 100 MG/1
100 TABLET, FILM COATED ORAL
Qty: 30 | Refills: 0 | Status: DISCONTINUED | COMMUNITY
Start: 2022-02-09 | End: 2024-05-13

## 2024-05-13 RX ORDER — SERTRALINE HYDROCHLORIDE 200 MG/1
200 CAPSULE ORAL DAILY
Refills: 0 | Status: ACTIVE | COMMUNITY

## 2024-05-13 RX ORDER — LOSARTAN POTASSIUM 25 MG/1
25 TABLET, FILM COATED ORAL DAILY
Qty: 45 | Refills: 3 | Status: ACTIVE | COMMUNITY
Start: 1900-01-01 | End: 1900-01-01

## 2024-05-13 NOTE — DISCUSSION/SUMMARY
[FreeTextEntry1] : 69-year-old male smoker, systolic heart failure, former alcohol abuse, squamous and basal cell skin cancer treated, hypertension, coronary artery disease with PCI to proximal RCA 1/4/22, tobacco use. newly diagnosed cancer in polyp   Last visit, had concern for unstable angina but diagnosed with takotsubo's cardiomyopathy. Angiography had no obstructive disease. He also had no pulmonary hypertension to explain dyspnea.   # systolic chf now recovered EF compensated, CAD s/p PCI prox RCA 1/2022 - continue dapt - rosuvastatin 40. Goal LDL less than 70. - Tobacco  service consulted but he does not appear to be amenable  - GDMT: with his hypotension after gabapentin/SSRI, lets do half of both the BB/ARB  # Ascending aorta 4.5 cm: watch every year 1/2025   Continue following up with specialists for cancer.  I have him seeing neurology for memory loss. Leading diagnosis is psychiatric in nature and could be related to former significant alcohol use.   Follow in 3 months. Follow closely with pulmonary. ER precautions given to patient.

## 2024-05-13 NOTE — REASON FOR VISIT
[Symptom and Test Evaluation] : symptom and test evaluation [CV Risk Factors and Non-Cardiac Disease] : CV risk factors and non-cardiac disease [Coronary Artery Disease] : coronary artery disease [Family Member] : family member [FreeTextEntry3] : Dr. Grace Gama

## 2024-05-13 NOTE — HISTORY OF PRESENT ILLNESS
[FreeTextEntry1] : 69-year-old male smoker, systolic heart failure, former alcohol abuse, squamous and basal cell skin cancer treated, hypertension, coronary artery disease with PCI to proximal RCA 1/4/22, tobacco use. newly diagnosed cancer in polyp  5/2024 VISIT: [facility] for hypotension during physical therapy.  Normal upon arrival to ER. PVCs.  K5.1.  Creatinine 1.14.  Normal LFT.  Lipase 79 upper limit 60.  Troponin negative x 2.  2/2024 VISIT: resolved. coronary angiography with non obstructive disease and no pulmonary hypertension. +tobacco use.   1/2024 VISIT: dyspnea over past months. EKG profoundly changed concerning for dynamic coronary disease. He is a very poor historian due to memory issues. still cigarette use. I am having a hard time discerning timeline.    5/2023 VISIT: echo today stable asc ao 4.4 cm. procedure in interim left shoulder surgery went well. increased tobacco use to 1/2 ppd. seeing pulmonary reports doing great from it.   1/2023 VISIT: no angina. feels well. no recent hospitalizations. doing cardiac rehab. lowering tobacco to 5 cig/day.   11/2022: colonoscopy biopsies show margins clean. dermatology with biopsies reportedly recurrence unfortunately. tte today low normal ef asc ao 4.5. declines cardiac rehab and tobacco  service.   10/2022: underwent colonoscopy removed several polyps and 1 cancerous per their report. pending further evaluation for margins etc. no angina. much improved.   4/2022: no angina. In interim, he had a pulmonary exacerbation after immunotherapy which improved.  Sees pulmonary. Had preoperative evaluation for flexible sigmoidoscopy for mass removal/colonoscopy given immunotherapy induced colitis. He was switched to Plavix  He denies angina or acute cardiac symptoms.  He has stable dyspnea with exertion and constant coughing. LDL 72.  Carotid duplex and AAA ultrasound done today as detailed. Compliant with medications. depression. fatigue.   1/2022 visit: Compliant with medications.  Encouraged tobacco cessation.  Co-pay for Brilinta elevated. Right radial artery access site well-healing.   TESTING I REVIEWED TODAY excluding above:  5/2023 tte  11/2022 TTE  7/2022 LABWORK: hgb 13.1. normal cmp. LDL 86. normal TFT. a1c 6.3.   4/2022: CAROTID: No significant disease AAA SCREENING: No AAA LABWORK: Resolved liver profile.  LDL 72.  Triglycerides 115.  HDL 79.  1/4/22: Echocardiogram: EF 40 to 45%.  Global.  Mild diastolic dysfunction.  Normal RV function.  PASP 46.  Aortic root 4.1. Coronary angiogram: IVUS guided PCI of proximal RCA with a 4.0 x 23 mm partha point (postdilated 4.5 mm). Moderate mid LAD disease not significant by far.  Normal LVEDP. A1c 6.3.   12/21 calcium score 683.  Concern for proximal multivessel disease Labwork: Hemoglobin 11.1.  Normal creatinine.  Elevated LFTs.  LDL 97 11/21 echocardiogram: EF 50, no wall motion abnormality, mild valvular heart disease.  PASP 36 Pharmacologic NST: EF 46%, fixed defects. JAMIE: No significant disease

## 2024-06-07 ENCOUNTER — APPOINTMENT (OUTPATIENT)
Dept: CARDIOLOGY | Facility: CLINIC | Age: 69
End: 2024-06-07
Payer: MEDICARE

## 2024-06-07 ENCOUNTER — NON-APPOINTMENT (OUTPATIENT)
Age: 69
End: 2024-06-07

## 2024-06-07 VITALS
BODY MASS INDEX: 26.44 KG/M2 | WEIGHT: 206 LBS | OXYGEN SATURATION: 96 % | SYSTOLIC BLOOD PRESSURE: 128 MMHG | HEIGHT: 74 IN | HEART RATE: 65 BPM | DIASTOLIC BLOOD PRESSURE: 70 MMHG

## 2024-06-07 DIAGNOSIS — R00.1 BRADYCARDIA, UNSPECIFIED: ICD-10-CM

## 2024-06-07 PROCEDURE — 93000 ELECTROCARDIOGRAM COMPLETE: CPT

## 2024-06-07 PROCEDURE — 99214 OFFICE O/P EST MOD 30 MIN: CPT

## 2024-06-07 NOTE — HISTORY OF PRESENT ILLNESS
[FreeTextEntry1] : 69 year old gentleman with significant anterograde amnesia who has history of RCA PCI in 01/04/22 and non-obstructive progression of disease on 01/2024 cardiac catheterization performed by writer, who was referred to our office as an urgent add-on for HR of 30 periprocedurally during colonoscopy.   Cannot rule out monitor or human error in evaluation of heart rate. Unclear if rhythm change. Patient states that he's been feeling fine and has not had this issue. Just went to cardiac rehab prior to the colo and to the visit per his report. Has significant memory challenges which makes HPI difficult to ascertain. Regardless, I will request patient hold his metoprolol and we will obtain a live zio x 14 days with a 2 week follow-up to review results. If no issues, will gradually reintroduce metoprolol, possibly at a lower target dose as patient does say it makes him feel a little more tired and breathless. No chest pain, presyncope, syncope.   Testing Reviewed:  Today's ECG -- unchanged from prior.  05/2023 TTE  11/2022 TTE 07/2022 LABWORK: hgb 13.1. normal cmp. LDL 86. normal TFT. a1c 6.3. 04/2022:  CAROTID: No significant disease AAA SCREENING: No AAA LABWORK: Resolved liver profile. LDL 72. Triglycerides 115. HDL 79.  01/2022: Echocardiogram: EF 40 to 45%. Global. Mild diastolic dysfunction. Normal RV function. PASP 46. Aortic root 4.1. Coronary angiogram: IVUS guided PCI of proximal RCA with a 4.0 x 23 mm partha point (postdilated 4.5 mm). Moderate mid LAD disease not significant by far. Normal LVEDP. A1c 6.3.  12/21 calcium score 683. Concern for proximal multivessel disease Labwork: Hemoglobin 11.1. Normal creatinine. Elevated LFTs. LDL 97 11/21 echocardiogram: EF 50, no wall motion abnormality, mild valvular heart disease. PASP 36 Pharmacologic NST: EF 46%, fixed defects. JAMIE: No significant disease

## 2024-06-07 NOTE — DISCUSSION/SUMMARY
[FreeTextEntry1] : 69 year old gentleman with significant anterograde amnesia and CAD s/p PCI of RCA and preserved LVEF 50% (very mild ischemic CM), who was referred today for asymptomatic bradycardia noted during colonoscopy -- unclear at what stage in encounter it was observed, but patient was not symptomatic and had engaged with cardiac rehab this morning per his report. I suspect monitor error. To be safe, will hold metoprolol, obtain a 14 day live zio, and have patient follow-up in two weeks. His ECG is unchanged from prior.    Appreciate the opportunity to participate in the care of Mr. BUNCH. Strict ER precautions were provided to patient for symptoms that arise or worsen. Please do not hesitate to reach out with any questions, concerns, or changes in clinical status.   Javier Hilliard MD, [facility]  Interventional & Clinical Cardiology   [EKG obtained to assist in diagnosis and management of assessed problem(s)] : EKG obtained to assist in diagnosis and management of assessed problem(s)

## 2024-06-10 ENCOUNTER — APPOINTMENT (OUTPATIENT)
Dept: CARDIOLOGY | Facility: CLINIC | Age: 69
End: 2024-06-10

## 2024-07-08 ENCOUNTER — APPOINTMENT (OUTPATIENT)
Dept: CARDIOLOGY | Facility: CLINIC | Age: 69
End: 2024-07-08
Payer: MEDICARE

## 2024-07-08 VITALS
OXYGEN SATURATION: 98 % | WEIGHT: 210 LBS | SYSTOLIC BLOOD PRESSURE: 128 MMHG | HEIGHT: 74 IN | DIASTOLIC BLOOD PRESSURE: 74 MMHG | HEART RATE: 64 BPM | BODY MASS INDEX: 26.95 KG/M2

## 2024-07-08 DIAGNOSIS — I10 ESSENTIAL (PRIMARY) HYPERTENSION: ICD-10-CM

## 2024-07-08 DIAGNOSIS — F17.200 NICOTINE DEPENDENCE, UNSPECIFIED, UNCOMPLICATED: ICD-10-CM

## 2024-07-08 DIAGNOSIS — I25.10 ATHEROSCLEROTIC HEART DISEASE OF NATIVE CORONARY ARTERY W/OUT ANGINA PECTORIS: ICD-10-CM

## 2024-07-08 DIAGNOSIS — Z95.5 PRESENCE OF CORONARY ANGIOPLASTY IMPLANT AND GRAFT: ICD-10-CM

## 2024-07-08 DIAGNOSIS — I49.3 VENTRICULAR PREMATURE DEPOLARIZATION: ICD-10-CM

## 2024-07-08 PROCEDURE — 99214 OFFICE O/P EST MOD 30 MIN: CPT

## 2024-08-12 ENCOUNTER — APPOINTMENT (OUTPATIENT)
Dept: CARDIOLOGY | Facility: CLINIC | Age: 69
End: 2024-08-12
Payer: MEDICARE

## 2024-08-12 VITALS
HEART RATE: 61 BPM | WEIGHT: 212 LBS | OXYGEN SATURATION: 96 % | BODY MASS INDEX: 27.22 KG/M2 | SYSTOLIC BLOOD PRESSURE: 124 MMHG | DIASTOLIC BLOOD PRESSURE: 62 MMHG

## 2024-08-12 DIAGNOSIS — I49.3 VENTRICULAR PREMATURE DEPOLARIZATION: ICD-10-CM

## 2024-08-12 DIAGNOSIS — R06.02 SHORTNESS OF BREATH: ICD-10-CM

## 2024-08-12 DIAGNOSIS — Z95.5 PRESENCE OF CORONARY ANGIOPLASTY IMPLANT AND GRAFT: ICD-10-CM

## 2024-08-12 DIAGNOSIS — I25.10 ATHEROSCLEROTIC HEART DISEASE OF NATIVE CORONARY ARTERY W/OUT ANGINA PECTORIS: ICD-10-CM

## 2024-08-12 DIAGNOSIS — I10 ESSENTIAL (PRIMARY) HYPERTENSION: ICD-10-CM

## 2024-08-12 DIAGNOSIS — F17.200 NICOTINE DEPENDENCE, UNSPECIFIED, UNCOMPLICATED: ICD-10-CM

## 2024-08-12 PROCEDURE — 99214 OFFICE O/P EST MOD 30 MIN: CPT

## 2024-08-12 NOTE — DISCUSSION/SUMMARY
[FreeTextEntry1] : 69-year-old male smoker, systolic heart failure, former alcohol abuse, squamous and basal cell skin cancer treated, coronary artery disease with PCI to proximal RCA 1/4/22, tobacco use. newly diagnosed cancer in polyp   Recent visit, had concern for unstable angina but diagnosed with takotsubo's cardiomyopathy. Angiography had no obstructive disease. He also had no pulmonary hypertension to explain dyspnea.   He has low normal EF. He is to maintain DAPT and statin goal LDL<70. He has 6% PVCs off BB, re introduced at lower dose but still didn't tolerate. he will stay off BB and repeat zio patch in 2 months pre visit.   Needs to quit tobacco use.   He had hypotension after gabapentin/SSRI. off gabapentin now.   # Ascending aorta 4.5 cm: watch every year   Continue following up with specialists for cancer.  I have him seeing neurology for memory loss. Leading diagnosis is psychiatric/psychologic in nature and could be related to former significant alcohol use.   Attempt to restart cardiac rehab [facility]>   Follow 3 months with echo and zio patch. ER precautions given to patient.

## 2024-08-12 NOTE — HISTORY OF PRESENT ILLNESS
[FreeTextEntry1] : 69-year-old male smoker, systolic heart failure, former alcohol abuse, squamous and basal cell skin cancer treated, hypertension, coronary artery disease with PCI to proximal RCA 1/4/22, tobacco use. newly diagnosed cancer in polyp  8/2024 VISIT: off BB feeling better as per patient and spouse. seeing neuropsych NP.   7/2024 VISIT: very poor memory for some reason. colonoscopy, ?bradycardia. rhythm monitor without bradycardic episode but did have 6% PVCs.   5/2024 VISIT: [facility] for hypotension during physical therapy.  Normal upon arrival to ER. PVCs.  K5.1.  Creatinine 1.14.  Normal LFT.  Lipase 79 upper limit 60.  Troponin negative x 2.  2/2024 VISIT: resolved. coronary angiography with non obstructive disease and no pulmonary hypertension. +tobacco use.   1/2024 VISIT: dyspnea over past months. EKG profoundly changed concerning for dynamic coronary disease. He is a very poor historian due to memory issues. still cigarette use. I am having a hard time discerning timeline.    5/2023 VISIT: echo today stable asc ao 4.4 cm. procedure in interim left shoulder surgery went well. increased tobacco use to 1/2 ppd. seeing pulmonary reports doing great from it.   1/2023 VISIT: no angina. feels well. no recent hospitalizations. doing cardiac rehab. lowering tobacco to 5 cig/day.   11/2022: colonoscopy biopsies show margins clean. dermatology with biopsies reportedly recurrence unfortunately. tte today low normal ef asc ao 4.5. declines cardiac rehab and tobacco  service.   10/2022: underwent colonoscopy removed several polyps and 1 cancerous per their report. pending further evaluation for margins etc. no angina. much improved.   4/2022: no angina. In interim, he had a pulmonary exacerbation after immunotherapy which improved.  Sees pulmonary. Had preoperative evaluation for flexible sigmoidoscopy for mass removal/colonoscopy given immunotherapy induced colitis. He was switched to Plavix  He denies angina or acute cardiac symptoms.  He has stable dyspnea with exertion and constant coughing. LDL 72.  Carotid duplex and AAA ultrasound done today as detailed. Compliant with medications. depression. fatigue.   1/2022 visit: Compliant with medications.  Encouraged tobacco cessation.  Co-pay for Brilinta elevated. Right radial artery access site well-healing.  **TESTING I REVIEWED TODAY excluding above:  5/2023 tte  11/2022 TTE  7/2022 LABWORK: hgb 13.1. normal cmp. LDL 86. normal TFT. a1c 6.3.   4/2022: CAROTID: No significant disease AAA SCREENING: No AAA LABWORK: Resolved liver profile.  LDL 72.  Triglycerides 115.  HDL 79.  1/4/22: Echocardiogram: EF 40 to 45%.  Global.  Mild diastolic dysfunction.  Normal RV function.  PASP 46.  Aortic root 4.1. Coronary angiogram: IVUS guided PCI of proximal RCA with a 4.0 x 23 mm partha point (postdilated 4.5 mm). Moderate mid LAD disease not significant by far.  Normal LVEDP. A1c 6.3.   12/21 calcium score 683.  Concern for proximal multivessel disease Labwork: Hemoglobin 11.1.  Normal creatinine.  Elevated LFTs.  LDL 97 11/21 echocardiogram: EF 50, no wall motion abnormality, mild valvular heart disease.  PASP 36 Pharmacologic NST: EF 46%, fixed defects. JAMIE: No significant disease

## 2024-08-12 NOTE — HISTORY OF PRESENT ILLNESS
[FreeTextEntry1] : 69-year-old male smoker, systolic heart failure, former alcohol abuse, squamous and basal cell skin cancer treated, hypertension, coronary artery disease with PCI to proximal RCA 1/4/22, tobacco use. newly diagnosed cancer in polyp  8/2024 VISIT: off BB feeling better as per patient and spouse. seeing neuropsych NP.   7/2024 VISIT: very poor memory for some reason. colonoscopy, ?bradycardia. rhythm monitor without bradycardic episode but did have 6% PVCs.   5/2024 VISIT: [facility] for hypotension during physical therapy.  Normal upon arrival to ER. PVCs.  K5.1.  Creatinine 1.14.  Normal LFT.  Lipase 79 upper limit 60.  Troponin negative x 2.  2/2024 VISIT: resolved. coronary angiography with non obstructive disease and no pulmonary hypertension. +tobacco use.   1/2024 VISIT: dyspnea over past months. EKG profoundly changed concerning for dynamic coronary disease. He is a very poor historian due to memory issues. still cigarette use. I am having a hard time discerning timeline.    5/2023 VISIT: echo today stable asc ao 4.4 cm. procedure in interim left shoulder surgery went well. increased tobacco use to 1/2 ppd. seeing pulmonary reports doing great from it.   1/2023 VISIT: no angina. feels well. no recent hospitalizations. doing cardiac rehab. lowering tobacco to 5 cig/day.   11/2022: colonoscopy biopsies show margins clean. dermatology with biopsies reportedly recurrence unfortunately. tte today low normal ef asc ao 4.5. declines cardiac rehab and tobacco  service.   10/2022: underwent colonoscopy removed several polyps and 1 cancerous per their report. pending further evaluation for margins etc. no angina. much improved.   4/2022: no angina. In interim, he had a pulmonary exacerbation after immunotherapy which improved.  Sees pulmonary. Had preoperative evaluation for flexible sigmoidoscopy for mass removal/colonoscopy given immunotherapy induced colitis. He was switched to Plavix  He denies angina or acute cardiac symptoms.  He has stable dyspnea with exertion and constant coughing. LDL 72.  Carotid duplex and AAA ultrasound done today as detailed. Compliant with medications. depression. fatigue.   1/2022 visit: Compliant with medications.  Encouraged tobacco cessation.  Co-pay for Brilinta elevated. Right radial artery access site well-healing.  **TESTING I REVIEWED TODAY excluding above:  5/2023 tte  11/2022 TTE  7/2022 LABWORK: hgb 13.1. normal cmp. LDL 86. normal TFT. a1c 6.3.   4/2022: CAROTID: No significant disease AAA SCREENING: No AAA LABWORK: Resolved liver profile.  LDL 72.  Triglycerides 115.  HDL 79.  1/4/22: Echocardiogram: EF 40 to 45%.  Global.  Mild diastolic dysfunction.  Normal RV function.  PASP 46.  Aortic root 4.1. Coronary angiogram: IVUS guided PCI of proximal RCA with a 4.0 x 23 mm partha point (postdilated 4.5 mm). Moderate mid LAD disease not significant by far.  Normal LVEDP. A1c 6.3.   12/21 calcium score 683.  Concern for proximal multivessel disease Labwork: Hemoglobin 11.1.  Normal creatinine.  Elevated LFTs.  LDL 97 11/21 echocardiogram: EF 50, no wall motion abnormality, mild valvular heart disease.  PASP 36 Pharmacologic NST: EF 46%, fixed defects. JAMIE: No significant disease

## 2024-10-09 ENCOUNTER — APPOINTMENT (OUTPATIENT)
Dept: CARDIOLOGY | Facility: CLINIC | Age: 69
End: 2024-10-09
Payer: MEDICARE

## 2024-10-09 PROCEDURE — 93242 EXT ECG>48HR<7D RECORDING: CPT

## 2024-10-09 PROCEDURE — 93306 TTE W/DOPPLER COMPLETE: CPT

## 2024-10-24 ENCOUNTER — NON-APPOINTMENT (OUTPATIENT)
Age: 69
End: 2024-10-24

## 2024-10-24 PROCEDURE — 93244 EXT ECG>48HR<7D REV&INTERPJ: CPT

## 2024-11-05 ENCOUNTER — APPOINTMENT (OUTPATIENT)
Dept: CARDIOLOGY | Facility: CLINIC | Age: 69
End: 2024-11-05
Payer: MEDICARE

## 2024-11-05 PROCEDURE — 93015 CV STRESS TEST SUPVJ I&R: CPT

## 2024-11-05 PROCEDURE — 78452 HT MUSCLE IMAGE SPECT MULT: CPT

## 2024-11-05 PROCEDURE — A9502: CPT | Mod: JZ

## 2024-11-11 ENCOUNTER — APPOINTMENT (OUTPATIENT)
Dept: CARDIOLOGY | Facility: CLINIC | Age: 69
End: 2024-11-11
Payer: MEDICARE

## 2024-11-11 VITALS
DIASTOLIC BLOOD PRESSURE: 56 MMHG | WEIGHT: 214 LBS | BODY MASS INDEX: 27.48 KG/M2 | HEART RATE: 67 BPM | OXYGEN SATURATION: 97 % | SYSTOLIC BLOOD PRESSURE: 124 MMHG

## 2024-11-11 DIAGNOSIS — F17.200 NICOTINE DEPENDENCE, UNSPECIFIED, UNCOMPLICATED: ICD-10-CM

## 2024-11-11 DIAGNOSIS — I49.3 VENTRICULAR PREMATURE DEPOLARIZATION: ICD-10-CM

## 2024-11-11 DIAGNOSIS — R00.1 BRADYCARDIA, UNSPECIFIED: ICD-10-CM

## 2024-11-11 DIAGNOSIS — Z95.5 PRESENCE OF CORONARY ANGIOPLASTY IMPLANT AND GRAFT: ICD-10-CM

## 2024-11-11 DIAGNOSIS — R06.02 SHORTNESS OF BREATH: ICD-10-CM

## 2024-11-11 DIAGNOSIS — I25.10 ATHEROSCLEROTIC HEART DISEASE OF NATIVE CORONARY ARTERY W/OUT ANGINA PECTORIS: ICD-10-CM

## 2024-11-11 DIAGNOSIS — I10 ESSENTIAL (PRIMARY) HYPERTENSION: ICD-10-CM

## 2024-11-11 PROCEDURE — 99214 OFFICE O/P EST MOD 30 MIN: CPT

## 2024-11-11 RX ORDER — CEFPROZIL 500 MG/1
TABLET ORAL
Refills: 0 | Status: DISCONTINUED | COMMUNITY
End: 2024-11-11

## 2024-11-11 RX ORDER — DONEPEZIL HYDROCHLORIDE 5 MG/1
5 TABLET ORAL TWICE DAILY
Qty: 90 | Refills: 0 | Status: ACTIVE | COMMUNITY
Start: 2024-11-11

## 2025-01-13 ENCOUNTER — APPOINTMENT (OUTPATIENT)
Dept: CARDIOLOGY | Facility: CLINIC | Age: 70
End: 2025-01-13

## 2025-01-13 PROCEDURE — 93242 EXT ECG>48HR<7D RECORDING: CPT

## 2025-01-23 DIAGNOSIS — I49.3 VENTRICULAR PREMATURE DEPOLARIZATION: ICD-10-CM

## 2025-01-23 DIAGNOSIS — I25.10 ATHEROSCLEROTIC HEART DISEASE OF NATIVE CORONARY ARTERY W/OUT ANGINA PECTORIS: ICD-10-CM

## 2025-01-23 DIAGNOSIS — F03.90 UNSPECIFIED DEMENTIA W/OUT BEHAVIORAL DISTURBANCE: ICD-10-CM

## 2025-01-24 ENCOUNTER — NON-APPOINTMENT (OUTPATIENT)
Age: 70
End: 2025-01-24

## 2025-02-10 ENCOUNTER — APPOINTMENT (OUTPATIENT)
Dept: CARDIOLOGY | Facility: CLINIC | Age: 70
End: 2025-02-10
Payer: MEDICARE

## 2025-02-10 VITALS — WEIGHT: 213 LBS | SYSTOLIC BLOOD PRESSURE: 106 MMHG | BODY MASS INDEX: 27.35 KG/M2 | DIASTOLIC BLOOD PRESSURE: 60 MMHG

## 2025-02-10 VITALS — OXYGEN SATURATION: 93 % | HEART RATE: 47 BPM

## 2025-02-10 VITALS — OXYGEN SATURATION: 47 % | HEART RATE: 93 BPM

## 2025-02-10 DIAGNOSIS — R06.02 SHORTNESS OF BREATH: ICD-10-CM

## 2025-02-10 DIAGNOSIS — F32.A DEPRESSION, UNSPECIFIED: ICD-10-CM

## 2025-02-10 PROCEDURE — 99214 OFFICE O/P EST MOD 30 MIN: CPT

## 2025-02-10 RX ORDER — MEMANTINE HYDROCHLORIDE 10 MG/1
10 TABLET, FILM COATED ORAL
Refills: 0 | Status: ACTIVE | COMMUNITY

## 2025-02-12 ENCOUNTER — APPOINTMENT (OUTPATIENT)
Dept: ELECTROPHYSIOLOGY | Facility: CLINIC | Age: 70
End: 2025-02-12
Payer: MEDICARE

## 2025-02-12 VITALS
OXYGEN SATURATION: 95 % | HEART RATE: 51 BPM | WEIGHT: 216 LBS | DIASTOLIC BLOOD PRESSURE: 52 MMHG | SYSTOLIC BLOOD PRESSURE: 100 MMHG | BODY MASS INDEX: 27.73 KG/M2

## 2025-02-12 DIAGNOSIS — I25.10 ATHEROSCLEROTIC HEART DISEASE OF NATIVE CORONARY ARTERY W/OUT ANGINA PECTORIS: ICD-10-CM

## 2025-02-12 DIAGNOSIS — Z78.9 OTHER SPECIFIED HEALTH STATUS: ICD-10-CM

## 2025-02-12 DIAGNOSIS — Z82.49 FAMILY HISTORY OF ISCHEMIC HEART DISEASE AND OTHER DISEASES OF THE CIRCULATORY SYSTEM: ICD-10-CM

## 2025-02-12 DIAGNOSIS — Z87.898 PERSONAL HISTORY OF OTHER SPECIFIED CONDITIONS: ICD-10-CM

## 2025-02-12 DIAGNOSIS — I49.3 VENTRICULAR PREMATURE DEPOLARIZATION: ICD-10-CM

## 2025-02-12 DIAGNOSIS — I10 ESSENTIAL (PRIMARY) HYPERTENSION: ICD-10-CM

## 2025-02-12 DIAGNOSIS — Z72.3 LACK OF PHYSICAL EXERCISE: ICD-10-CM

## 2025-02-12 DIAGNOSIS — Z86.79 PERSONAL HISTORY OF OTHER DISEASES OF THE CIRCULATORY SYSTEM: ICD-10-CM

## 2025-02-12 DIAGNOSIS — R94.39 ABNORMAL RESULT OF OTHER CARDIOVASCULAR FUNCTION STUDY: ICD-10-CM

## 2025-02-12 DIAGNOSIS — F17.200 NICOTINE DEPENDENCE, UNSPECIFIED, UNCOMPLICATED: ICD-10-CM

## 2025-02-12 DIAGNOSIS — Z95.5 PRESENCE OF CORONARY ANGIOPLASTY IMPLANT AND GRAFT: ICD-10-CM

## 2025-02-12 DIAGNOSIS — R00.1 BRADYCARDIA, UNSPECIFIED: ICD-10-CM

## 2025-02-12 PROCEDURE — 99214 OFFICE O/P EST MOD 30 MIN: CPT

## 2025-02-12 PROCEDURE — G2211 COMPLEX E/M VISIT ADD ON: CPT

## 2025-02-14 ENCOUNTER — NON-APPOINTMENT (OUTPATIENT)
Age: 70
End: 2025-02-14

## 2025-02-16 PROBLEM — Z87.898 HISTORY OF PALPITATIONS: Status: RESOLVED | Noted: 2021-09-29 | Resolved: 2025-02-16

## 2025-02-16 PROBLEM — Z87.898 HISTORY OF CHEST PAIN: Status: RESOLVED | Noted: 2021-09-29 | Resolved: 2025-02-16

## 2025-02-20 ENCOUNTER — OUTPATIENT (OUTPATIENT)
Dept: OUTPATIENT SERVICES | Facility: HOSPITAL | Age: 70
LOS: 1 days | End: 2025-02-20
Payer: MEDICARE

## 2025-02-20 DIAGNOSIS — G47.33 OBSTRUCTIVE SLEEP APNEA (ADULT) (PEDIATRIC): ICD-10-CM

## 2025-02-20 PROCEDURE — 95800 SLP STDY UNATTENDED: CPT

## 2025-02-21 ENCOUNTER — NON-APPOINTMENT (OUTPATIENT)
Age: 70
End: 2025-02-21

## 2025-03-25 ENCOUNTER — APPOINTMENT (OUTPATIENT)
Dept: PULMONOLOGY | Facility: CLINIC | Age: 70
End: 2025-03-25
Payer: MEDICARE

## 2025-03-25 DIAGNOSIS — G47.33 OBSTRUCTIVE SLEEP APNEA (ADULT) (PEDIATRIC): ICD-10-CM

## 2025-03-25 DIAGNOSIS — Z95.5 PRESENCE OF CORONARY ANGIOPLASTY IMPLANT AND GRAFT: ICD-10-CM

## 2025-03-25 DIAGNOSIS — C44.92 SQUAMOUS CELL CARCINOMA OF SKIN, UNSPECIFIED: ICD-10-CM

## 2025-03-25 DIAGNOSIS — F32.A DEPRESSION, UNSPECIFIED: ICD-10-CM

## 2025-03-25 DIAGNOSIS — F17.200 NICOTINE DEPENDENCE, UNSPECIFIED, UNCOMPLICATED: ICD-10-CM

## 2025-03-25 DIAGNOSIS — I25.10 ATHEROSCLEROTIC HEART DISEASE OF NATIVE CORONARY ARTERY W/OUT ANGINA PECTORIS: ICD-10-CM

## 2025-03-25 DIAGNOSIS — R06.02 SHORTNESS OF BREATH: ICD-10-CM

## 2025-03-25 DIAGNOSIS — Z87.891 PERSONAL HISTORY OF NICOTINE DEPENDENCE: ICD-10-CM

## 2025-03-25 DIAGNOSIS — F03.90 UNSPECIFIED DEMENTIA W/OUT BEHAVIORAL DISTURBANCE: ICD-10-CM

## 2025-03-25 PROCEDURE — G2211 COMPLEX E/M VISIT ADD ON: CPT | Mod: 2W

## 2025-03-25 PROCEDURE — 99214 OFFICE O/P EST MOD 30 MIN: CPT | Mod: 25,2W

## 2025-03-25 PROCEDURE — 99406 BEHAV CHNG SMOKING 3-10 MIN: CPT | Mod: 2W

## 2025-04-16 ENCOUNTER — RESULT REVIEW (OUTPATIENT)
Age: 70
End: 2025-04-16

## 2025-04-16 ENCOUNTER — APPOINTMENT (OUTPATIENT)
Dept: MRI IMAGING | Facility: CLINIC | Age: 70
End: 2025-04-16
Payer: MEDICARE

## 2025-04-16 ENCOUNTER — APPOINTMENT (OUTPATIENT)
Dept: RADIOLOGY | Facility: CLINIC | Age: 70
End: 2025-04-16
Payer: MEDICARE

## 2025-04-16 ENCOUNTER — APPOINTMENT (OUTPATIENT)
Dept: RADIOLOGY | Facility: CLINIC | Age: 70
End: 2025-04-16

## 2025-04-16 DIAGNOSIS — I26.99 OTHER PULMONARY EMBOLISM W/OUT ACUTE COR PULMONALE: ICD-10-CM

## 2025-04-16 PROCEDURE — 74018 RADEX ABDOMEN 1 VIEW: CPT

## 2025-04-16 PROCEDURE — A9585: CPT | Mod: JW

## 2025-04-16 PROCEDURE — 71045 X-RAY EXAM CHEST 1 VIEW: CPT

## 2025-04-16 PROCEDURE — 75561 CARDIAC MRI FOR MORPH W/DYE: CPT | Mod: TC

## 2025-04-26 ENCOUNTER — NON-APPOINTMENT (OUTPATIENT)
Age: 70
End: 2025-04-26

## 2025-04-30 ENCOUNTER — NON-APPOINTMENT (OUTPATIENT)
Age: 70
End: 2025-04-30

## 2025-04-30 ENCOUNTER — APPOINTMENT (OUTPATIENT)
Dept: ELECTROPHYSIOLOGY | Facility: CLINIC | Age: 70
End: 2025-04-30
Payer: MEDICARE

## 2025-04-30 VITALS
BODY MASS INDEX: 27.72 KG/M2 | HEIGHT: 74 IN | OXYGEN SATURATION: 95 % | SYSTOLIC BLOOD PRESSURE: 110 MMHG | DIASTOLIC BLOOD PRESSURE: 40 MMHG | HEART RATE: 30 BPM | WEIGHT: 216 LBS

## 2025-04-30 DIAGNOSIS — Z95.5 PRESENCE OF CORONARY ANGIOPLASTY IMPLANT AND GRAFT: ICD-10-CM

## 2025-04-30 DIAGNOSIS — Z72.3 LACK OF PHYSICAL EXERCISE: ICD-10-CM

## 2025-04-30 DIAGNOSIS — I49.3 VENTRICULAR PREMATURE DEPOLARIZATION: ICD-10-CM

## 2025-04-30 DIAGNOSIS — I10 ESSENTIAL (PRIMARY) HYPERTENSION: ICD-10-CM

## 2025-04-30 DIAGNOSIS — Z82.49 FAMILY HISTORY OF ISCHEMIC HEART DISEASE AND OTHER DISEASES OF THE CIRCULATORY SYSTEM: ICD-10-CM

## 2025-04-30 DIAGNOSIS — F17.200 NICOTINE DEPENDENCE, UNSPECIFIED, UNCOMPLICATED: ICD-10-CM

## 2025-04-30 DIAGNOSIS — I25.10 ATHEROSCLEROTIC HEART DISEASE OF NATIVE CORONARY ARTERY W/OUT ANGINA PECTORIS: ICD-10-CM

## 2025-04-30 DIAGNOSIS — G47.33 OBSTRUCTIVE SLEEP APNEA (ADULT) (PEDIATRIC): ICD-10-CM

## 2025-04-30 DIAGNOSIS — Z87.898 PERSONAL HISTORY OF OTHER SPECIFIED CONDITIONS: ICD-10-CM

## 2025-04-30 DIAGNOSIS — Z78.9 OTHER SPECIFIED HEALTH STATUS: ICD-10-CM

## 2025-04-30 PROCEDURE — 99214 OFFICE O/P EST MOD 30 MIN: CPT

## 2025-04-30 PROCEDURE — G2211 COMPLEX E/M VISIT ADD ON: CPT

## 2025-04-30 PROCEDURE — 93000 ELECTROCARDIOGRAM COMPLETE: CPT

## 2025-04-30 RX ORDER — OMEGA-3/DHA/EPA/FISH OIL 300-1000MG
400 CAPSULE ORAL DAILY
Refills: 0 | Status: ACTIVE | COMMUNITY

## 2025-05-04 ENCOUNTER — OUTPATIENT (OUTPATIENT)
Dept: OUTPATIENT SERVICES | Facility: HOSPITAL | Age: 70
LOS: 1 days | End: 2025-05-04
Payer: MEDICARE

## 2025-05-04 DIAGNOSIS — G47.33 OBSTRUCTIVE SLEEP APNEA (ADULT) (PEDIATRIC): ICD-10-CM

## 2025-05-04 PROCEDURE — 95811 POLYSOM 6/>YRS CPAP 4/> PARM: CPT | Mod: 26

## 2025-05-04 PROCEDURE — 95811 POLYSOM 6/>YRS CPAP 4/> PARM: CPT

## 2025-05-29 ENCOUNTER — APPOINTMENT (OUTPATIENT)
Dept: PULMONOLOGY | Facility: CLINIC | Age: 70
End: 2025-05-29

## 2025-05-29 DIAGNOSIS — Z87.891 PERSONAL HISTORY OF NICOTINE DEPENDENCE: ICD-10-CM

## 2025-05-29 DIAGNOSIS — G47.33 OBSTRUCTIVE SLEEP APNEA (ADULT) (PEDIATRIC): ICD-10-CM

## 2025-05-29 DIAGNOSIS — R06.3 PERIODIC BREATHING: ICD-10-CM

## 2025-05-29 DIAGNOSIS — G47.31 PRIMARY CENTRAL SLEEP APNEA: ICD-10-CM

## 2025-05-29 PROCEDURE — G2211 COMPLEX E/M VISIT ADD ON: CPT | Mod: 2W

## 2025-05-29 PROCEDURE — 99214 OFFICE O/P EST MOD 30 MIN: CPT | Mod: 2W

## 2025-05-30 PROBLEM — G47.31 CENTRAL SLEEP APNEA: Status: ACTIVE | Noted: 2025-05-30

## 2025-05-30 PROBLEM — R06.3 CHEYNE-STOKES RESPIRATION: Status: ACTIVE | Noted: 2025-05-30

## 2025-06-15 ENCOUNTER — OUTPATIENT (OUTPATIENT)
Dept: OUTPATIENT SERVICES | Facility: HOSPITAL | Age: 70
LOS: 1 days | End: 2025-06-15
Payer: MEDICARE

## 2025-06-15 DIAGNOSIS — G47.33 OBSTRUCTIVE SLEEP APNEA (ADULT) (PEDIATRIC): ICD-10-CM

## 2025-06-15 PROCEDURE — 95811 POLYSOM 6/>YRS CPAP 4/> PARM: CPT

## 2025-06-15 PROCEDURE — 95811 POLYSOM 6/>YRS CPAP 4/> PARM: CPT | Mod: 26

## 2025-07-28 ENCOUNTER — APPOINTMENT (OUTPATIENT)
Dept: CARDIOLOGY | Facility: CLINIC | Age: 70
End: 2025-07-28
Payer: MEDICARE

## 2025-07-28 VITALS
BODY MASS INDEX: 26.45 KG/M2 | SYSTOLIC BLOOD PRESSURE: 124 MMHG | OXYGEN SATURATION: 97 % | DIASTOLIC BLOOD PRESSURE: 62 MMHG | HEART RATE: 84 BPM | WEIGHT: 206 LBS

## 2025-07-28 DIAGNOSIS — Z86.711 PERSONAL HISTORY OF PULMONARY EMBOLISM: ICD-10-CM

## 2025-07-28 DIAGNOSIS — R06.02 SHORTNESS OF BREATH: ICD-10-CM

## 2025-07-28 DIAGNOSIS — I10 ESSENTIAL (PRIMARY) HYPERTENSION: ICD-10-CM

## 2025-07-28 DIAGNOSIS — I25.10 ATHEROSCLEROTIC HEART DISEASE OF NATIVE CORONARY ARTERY W/OUT ANGINA PECTORIS: ICD-10-CM

## 2025-07-28 DIAGNOSIS — I49.3 VENTRICULAR PREMATURE DEPOLARIZATION: ICD-10-CM

## 2025-07-28 DIAGNOSIS — Z95.5 PRESENCE OF CORONARY ANGIOPLASTY IMPLANT AND GRAFT: ICD-10-CM

## 2025-07-28 PROCEDURE — 99214 OFFICE O/P EST MOD 30 MIN: CPT

## 2025-07-28 RX ORDER — BUPROPION HYDROCHLORIDE 100 MG/1
100 TABLET, FILM COATED, EXTENDED RELEASE ORAL DAILY
Refills: 0 | Status: ACTIVE | COMMUNITY

## 2025-07-28 RX ORDER — SERTRALINE HYDROCHLORIDE 100 MG/1
100 TABLET, FILM COATED ORAL
Refills: 0 | Status: ACTIVE | COMMUNITY

## 2025-07-31 ENCOUNTER — APPOINTMENT (OUTPATIENT)
Dept: PULMONOLOGY | Facility: CLINIC | Age: 70
End: 2025-07-31
Payer: MEDICARE

## 2025-07-31 DIAGNOSIS — G47.33 OBSTRUCTIVE SLEEP APNEA (ADULT) (PEDIATRIC): ICD-10-CM

## 2025-07-31 DIAGNOSIS — G47.31 PRIMARY CENTRAL SLEEP APNEA: ICD-10-CM

## 2025-07-31 DIAGNOSIS — R06.3 PERIODIC BREATHING: ICD-10-CM

## 2025-07-31 PROCEDURE — G2211 COMPLEX E/M VISIT ADD ON: CPT | Mod: 2W

## 2025-07-31 PROCEDURE — 99214 OFFICE O/P EST MOD 30 MIN: CPT | Mod: 2W

## 2025-08-26 ENCOUNTER — OFFICE (OUTPATIENT)
Dept: URBAN - METROPOLITAN AREA CLINIC 8 | Facility: CLINIC | Age: 70
Setting detail: OPHTHALMOLOGY
End: 2025-08-26
Payer: MEDICARE

## 2025-08-26 DIAGNOSIS — H25.13: ICD-10-CM

## 2025-08-26 DIAGNOSIS — H52.4: ICD-10-CM

## 2025-08-26 PROCEDURE — 92015 DETERMINE REFRACTIVE STATE: CPT

## 2025-08-26 PROCEDURE — 92004 COMPRE OPH EXAM NEW PT 1/>: CPT

## 2025-08-26 ASSESSMENT — KERATOMETRY
OD_K1POWER_DIOPTERS: 42.50
OS_K2POWER_DIOPTERS: 43.00
OD_K2POWER_DIOPTERS: 43.00
OS_AXISANGLE_DEGREES: 177
OD_AXISANGLE_DEGREES: 013
OS_K1POWER_DIOPTERS: 42.25

## 2025-08-26 ASSESSMENT — REFRACTION_MANIFEST
OD_VA1: 20/20
OD_CYLINDER: -0.75
OD_CYLINDER: -0.75
OD_AXIS: 090
OD_SPHERE: +1.00
OS_CYLINDER: -1.00
OD_VA2: 20/20(J1+)
OS_AXIS: 090
OS_VA2: 20/20(J1+)
OS_ADD: +2.00
OS_VA2: 20/20(J1+)
OD_VA1: 20/20
OD_ADD: +2.00
OS_AXIS: 090
OS_SPHERE: +1.25
OD_AXIS: 090
OS_CYLINDER: -1.00
OS_VA1: 20/25+3
OU_VA: 20/20
OS_SPHERE: +1.25
OD_SPHERE: +1.00
OD_VA2: 20/20(J1+)
OU_VA: 20/20
OS_ADD: +2.00
OS_VA1: 20/25+3
OD_ADD: +2.00

## 2025-08-26 ASSESSMENT — REFRACTION_AUTOREFRACTION
OD_SPHERE: +1.25
OS_AXIS: 088
OD_CYLINDER: -0.75
OS_SPHERE: +1.50
OS_CYLINDER: -1.25
OD_AXIS: 089

## 2025-08-26 ASSESSMENT — VISUAL ACUITY
OD_BCVA: 20/40+
OS_BCVA: 20/40

## 2025-08-26 ASSESSMENT — CONFRONTATIONAL VISUAL FIELD TEST (CVF)
OD_FINDINGS: FULL
OS_FINDINGS: FULL

## 2025-09-11 ENCOUNTER — TRANSCRIPTION ENCOUNTER (OUTPATIENT)
Age: 70
End: 2025-09-11

## 2025-09-16 ENCOUNTER — APPOINTMENT (OUTPATIENT)
Dept: ELECTROPHYSIOLOGY | Facility: CLINIC | Age: 70
End: 2025-09-16

## 2025-09-16 VITALS
SYSTOLIC BLOOD PRESSURE: 148 MMHG | DIASTOLIC BLOOD PRESSURE: 52 MMHG | OXYGEN SATURATION: 99 % | WEIGHT: 201 LBS | HEIGHT: 74 IN | HEART RATE: 59 BPM | BODY MASS INDEX: 25.8 KG/M2

## 2025-09-16 DIAGNOSIS — Z87.898 PERSONAL HISTORY OF OTHER SPECIFIED CONDITIONS: ICD-10-CM

## 2025-09-16 DIAGNOSIS — R06.3 PERIODIC BREATHING: ICD-10-CM

## 2025-09-16 DIAGNOSIS — Z82.49 FAMILY HISTORY OF ISCHEMIC HEART DISEASE AND OTHER DISEASES OF THE CIRCULATORY SYSTEM: ICD-10-CM

## 2025-09-16 DIAGNOSIS — F32.A DEPRESSION, UNSPECIFIED: ICD-10-CM

## 2025-09-16 DIAGNOSIS — I49.3 VENTRICULAR PREMATURE DEPOLARIZATION: ICD-10-CM

## 2025-09-16 DIAGNOSIS — Z72.3 LACK OF PHYSICAL EXERCISE: ICD-10-CM

## 2025-09-16 DIAGNOSIS — G47.33 OBSTRUCTIVE SLEEP APNEA (ADULT) (PEDIATRIC): ICD-10-CM

## 2025-09-16 DIAGNOSIS — F03.90 UNSPECIFIED DEMENTIA W/OUT BEHAVIORAL DISTURBANCE: ICD-10-CM

## 2025-09-16 DIAGNOSIS — R00.1 BRADYCARDIA, UNSPECIFIED: ICD-10-CM

## 2025-09-16 DIAGNOSIS — Z78.9 OTHER SPECIFIED HEALTH STATUS: ICD-10-CM

## 2025-09-16 DIAGNOSIS — I25.10 ATHEROSCLEROTIC HEART DISEASE OF NATIVE CORONARY ARTERY W/OUT ANGINA PECTORIS: ICD-10-CM

## 2025-09-16 DIAGNOSIS — F17.200 NICOTINE DEPENDENCE, UNSPECIFIED, UNCOMPLICATED: ICD-10-CM

## 2025-09-16 DIAGNOSIS — G47.31 PRIMARY CENTRAL SLEEP APNEA: ICD-10-CM

## 2025-09-16 DIAGNOSIS — Z87.891 PERSONAL HISTORY OF NICOTINE DEPENDENCE: ICD-10-CM
